# Patient Record
Sex: FEMALE | Race: ASIAN | Employment: OTHER | URBAN - METROPOLITAN AREA
[De-identification: names, ages, dates, MRNs, and addresses within clinical notes are randomized per-mention and may not be internally consistent; named-entity substitution may affect disease eponyms.]

---

## 2017-01-01 ENCOUNTER — APPOINTMENT (OUTPATIENT)
Dept: CARDIOLOGY | Facility: CLINIC | Age: 82
DRG: 682 | End: 2017-01-01
Attending: INTERNAL MEDICINE
Payer: MEDICARE

## 2017-01-01 ENCOUNTER — HOSPITAL ENCOUNTER (INPATIENT)
Facility: CLINIC | Age: 82
LOS: 1 days | Discharge: HOME HEALTH | DRG: 682 | End: 2017-01-25
Attending: INTERNAL MEDICINE | Admitting: SURGERY
Payer: MEDICARE

## 2017-01-01 ENCOUNTER — APPOINTMENT (OUTPATIENT)
Dept: GENERAL RADIOLOGY | Facility: CLINIC | Age: 82
End: 2017-01-01
Attending: EMERGENCY MEDICINE
Payer: MEDICARE

## 2017-01-01 ENCOUNTER — TELEPHONE (OUTPATIENT)
Dept: INTERNAL MEDICINE | Facility: CLINIC | Age: 82
End: 2017-01-01

## 2017-01-01 ENCOUNTER — HOSPITAL ENCOUNTER (EMERGENCY)
Facility: CLINIC | Age: 82
Discharge: ANOTHER HOSPITAL | End: 2017-01-24
Attending: EMERGENCY MEDICINE | Admitting: EMERGENCY MEDICINE
Payer: MEDICARE

## 2017-01-01 ENCOUNTER — APPOINTMENT (OUTPATIENT)
Dept: CT IMAGING | Facility: CLINIC | Age: 82
End: 2017-01-01
Attending: EMERGENCY MEDICINE
Payer: MEDICARE

## 2017-01-01 VITALS
WEIGHT: 99 LBS | HEART RATE: 86 BPM | DIASTOLIC BLOOD PRESSURE: 75 MMHG | HEIGHT: 61 IN | BODY MASS INDEX: 18.69 KG/M2 | TEMPERATURE: 97.5 F | OXYGEN SATURATION: 99 % | RESPIRATION RATE: 20 BRPM | SYSTOLIC BLOOD PRESSURE: 140 MMHG

## 2017-01-01 VITALS
DIASTOLIC BLOOD PRESSURE: 100 MMHG | OXYGEN SATURATION: 96 % | RESPIRATION RATE: 20 BRPM | TEMPERATURE: 97.1 F | WEIGHT: 110.01 LBS | HEIGHT: 61 IN | SYSTOLIC BLOOD PRESSURE: 122 MMHG | BODY MASS INDEX: 20.77 KG/M2

## 2017-01-01 DIAGNOSIS — N17.9 ACUTE RENAL FAILURE, UNSPECIFIED ACUTE RENAL FAILURE TYPE (H): ICD-10-CM

## 2017-01-01 DIAGNOSIS — F51.01 PRIMARY INSOMNIA: ICD-10-CM

## 2017-01-01 DIAGNOSIS — G89.29 OTHER CHRONIC PAIN: Primary | ICD-10-CM

## 2017-01-01 DIAGNOSIS — I21.4 NSTEMI (NON-ST ELEVATED MYOCARDIAL INFARCTION) (H): ICD-10-CM

## 2017-01-01 DIAGNOSIS — N18.6 ESRD (END STAGE RENAL DISEASE) (H): Primary | ICD-10-CM

## 2017-01-01 DIAGNOSIS — E87.5 HYPERKALEMIA: ICD-10-CM

## 2017-01-01 LAB
ALBUMIN SERPL-MCNC: 2.4 G/DL (ref 3.4–5)
ALBUMIN UR-MCNC: 300 MG/DL
ALP SERPL-CCNC: 120 U/L (ref 40–150)
ALT SERPL W P-5'-P-CCNC: 33 U/L (ref 0–50)
ANION GAP SERPL CALCULATED.3IONS-SCNC: 10 MMOL/L (ref 3–14)
ANION GAP SERPL CALCULATED.3IONS-SCNC: 11 MMOL/L (ref 3–14)
ANION GAP SERPL CALCULATED.3IONS-SCNC: 11 MMOL/L (ref 3–14)
ANION GAP SERPL CALCULATED.3IONS-SCNC: 12 MMOL/L (ref 6–17)
APPEARANCE UR: CLEAR
AST SERPL W P-5'-P-CCNC: 55 U/L (ref 0–45)
BASE DEFICIT BLDA-SCNC: 11.2 MMOL/L
BASE DEFICIT BLDA-SCNC: 2.8 MMOL/L
BASE DEFICIT BLDV-SCNC: 2.6 MMOL/L
BASE DEFICIT BLDV-SCNC: 9.7 MMOL/L
BASOPHILS # BLD AUTO: 0 10E9/L (ref 0–0.2)
BASOPHILS NFR BLD AUTO: 0 %
BILIRUB DIRECT SERPL-MCNC: <0.1 MG/DL (ref 0–0.2)
BILIRUB SERPL-MCNC: 0.3 MG/DL (ref 0.2–1.3)
BILIRUB UR QL STRIP: NEGATIVE
BUN SERPL-MCNC: 100 MG/DL (ref 7–30)
BUN SERPL-MCNC: 102 MG/DL (ref 7–30)
BUN SERPL-MCNC: 103 MG/DL (ref 7–30)
BUN SERPL-MCNC: 111 MG/DL (ref 7–30)
CA-I BLD-MCNC: 3.9 MG/DL (ref 4.4–5.2)
CA-I BLD-SCNC: 3.9 MG/DL (ref 4.4–5.2)
CALCIUM SERPL-MCNC: 7.1 MG/DL (ref 8.5–10.1)
CALCIUM SERPL-MCNC: 7.3 MG/DL (ref 8.5–10.1)
CALCIUM SERPL-MCNC: 7.3 MG/DL (ref 8.5–10.1)
CHLORIDE BLD-SCNC: 104 MMOL/L (ref 94–109)
CHLORIDE SERPL-SCNC: 105 MMOL/L (ref 94–109)
CHLORIDE SERPL-SCNC: 106 MMOL/L (ref 94–109)
CHLORIDE SERPL-SCNC: 106 MMOL/L (ref 94–109)
CO2 BLD-SCNC: 21 MMOL/L (ref 20–32)
CO2 SERPL-SCNC: 17 MMOL/L (ref 20–32)
CO2 SERPL-SCNC: 22 MMOL/L (ref 20–32)
CO2 SERPL-SCNC: 22 MMOL/L (ref 20–32)
COLOR UR AUTO: YELLOW
CREAT BLD-MCNC: 10.5 MG/DL (ref 0.52–1.04)
CREAT SERPL-MCNC: 10.6 MG/DL (ref 0.52–1.04)
CREAT SERPL-MCNC: 10.6 MG/DL (ref 0.52–1.04)
CREAT SERPL-MCNC: 10.8 MG/DL (ref 0.52–1.04)
D DIMER PPP FEU-MCNC: 2.4 UG/ML FEU (ref 0–0.5)
DIFFERENTIAL METHOD BLD: ABNORMAL
EOSINOPHIL # BLD AUTO: 0 10E9/L (ref 0–0.7)
EOSINOPHIL NFR BLD AUTO: 0 %
ERYTHROCYTE [DISTWIDTH] IN BLOOD BY AUTOMATED COUNT: 13.7 % (ref 10–15)
ERYTHROCYTE [DISTWIDTH] IN BLOOD BY AUTOMATED COUNT: 14 % (ref 10–15)
ERYTHROCYTE [DISTWIDTH] IN BLOOD BY AUTOMATED COUNT: 14.5 % (ref 10–15)
ERYTHROCYTE [DISTWIDTH] IN BLOOD BY AUTOMATED COUNT: 14.5 % (ref 10–15)
GFR SERPL CREATININE-BSD FRML MDRD: 3 ML/MIN/1.7M2
GLUCOSE BLD-MCNC: 126 MG/DL (ref 70–99)
GLUCOSE BLDC GLUCOMTR-MCNC: 113 MG/DL (ref 70–99)
GLUCOSE BLDC GLUCOMTR-MCNC: 121 MG/DL (ref 70–99)
GLUCOSE BLDC GLUCOMTR-MCNC: 128 MG/DL (ref 70–99)
GLUCOSE BLDC GLUCOMTR-MCNC: 151 MG/DL (ref 70–99)
GLUCOSE BLDC GLUCOMTR-MCNC: 230 MG/DL (ref 70–99)
GLUCOSE BLDC GLUCOMTR-MCNC: 83 MG/DL (ref 70–99)
GLUCOSE BLDC GLUCOMTR-MCNC: 91 MG/DL (ref 70–99)
GLUCOSE SERPL-MCNC: 124 MG/DL (ref 70–99)
GLUCOSE SERPL-MCNC: 151 MG/DL (ref 70–99)
GLUCOSE SERPL-MCNC: 84 MG/DL (ref 70–99)
GLUCOSE UR STRIP-MCNC: 300 MG/DL
HCO3 BLD-SCNC: 15 MMOL/L (ref 21–28)
HCO3 BLD-SCNC: 23 MMOL/L (ref 21–28)
HCO3 BLDV-SCNC: 17 MMOL/L (ref 21–28)
HCO3 BLDV-SCNC: 23 MMOL/L (ref 21–28)
HCT VFR BLD AUTO: 25.9 % (ref 35–47)
HCT VFR BLD AUTO: 26.9 % (ref 35–47)
HCT VFR BLD AUTO: 27.8 % (ref 35–47)
HCT VFR BLD AUTO: 29.3 % (ref 35–47)
HCT VFR BLD CALC: 24 %PCV (ref 35–47)
HGB BLD CALC-MCNC: 8.2 G/DL (ref 11.7–15.7)
HGB BLD-MCNC: 8.5 G/DL (ref 11.7–15.7)
HGB BLD-MCNC: 8.8 G/DL (ref 11.7–15.7)
HGB BLD-MCNC: 9.1 G/DL (ref 11.7–15.7)
HGB BLD-MCNC: 9.3 G/DL (ref 11.7–15.7)
HGB UR QL STRIP: ABNORMAL
HYALINE CASTS #/AREA URNS LPF: 1 /LPF (ref 0–2)
IMM GRANULOCYTES # BLD: 0 10E9/L (ref 0–0.4)
IMM GRANULOCYTES NFR BLD: 0.6 %
INR PPP: 1.34 (ref 0.86–1.14)
INTERPRETATION ECG - MUSE: NORMAL
KETONES UR STRIP-MCNC: NEGATIVE MG/DL
LEUKOCYTE ESTERASE UR QL STRIP: NEGATIVE
LIPASE SERPL-CCNC: 501 U/L (ref 73–393)
LMWH PPP CHRO-ACNC: 0.86 IU/ML
LYMPHOCYTES # BLD AUTO: 0.9 10E9/L (ref 0.8–5.3)
LYMPHOCYTES NFR BLD AUTO: 19.2 %
MAGNESIUM SERPL-MCNC: 3.5 MG/DL (ref 1.6–2.3)
MAGNESIUM SERPL-MCNC: 3.7 MG/DL (ref 1.6–2.3)
MAGNESIUM SERPL-MCNC: 3.8 MG/DL (ref 1.6–2.3)
MCH RBC QN AUTO: 29.7 PG (ref 26.5–33)
MCH RBC QN AUTO: 30 PG (ref 26.5–33)
MCH RBC QN AUTO: 30.2 PG (ref 26.5–33)
MCH RBC QN AUTO: 30.4 PG (ref 26.5–33)
MCHC RBC AUTO-ENTMCNC: 31.7 G/DL (ref 31.5–36.5)
MCHC RBC AUTO-ENTMCNC: 32.7 G/DL (ref 31.5–36.5)
MCHC RBC AUTO-ENTMCNC: 32.7 G/DL (ref 31.5–36.5)
MCHC RBC AUTO-ENTMCNC: 32.8 G/DL (ref 31.5–36.5)
MCV RBC AUTO: 91 FL (ref 78–100)
MCV RBC AUTO: 92 FL (ref 78–100)
MCV RBC AUTO: 93 FL (ref 78–100)
MCV RBC AUTO: 95 FL (ref 78–100)
MONOCYTES # BLD AUTO: 0.3 10E9/L (ref 0–1.3)
MONOCYTES NFR BLD AUTO: 7.2 %
MUCOUS THREADS #/AREA URNS LPF: PRESENT /LPF
NEUTROPHILS # BLD AUTO: 3.5 10E9/L (ref 1.6–8.3)
NEUTROPHILS NFR BLD AUTO: 72 %
NITRATE UR QL: NEGATIVE
NRBC # BLD AUTO: 0 10*3/UL
NRBC BLD AUTO-RTO: 1 /100
O2/TOTAL GAS SETTING VFR VENT: ABNORMAL %
OXYHGB MFR BLDV: 65 %
OXYHGB MFR BLDV: 98 %
PCO2 BLD: 35 MM HG (ref 35–45)
PCO2 BLD: 41 MM HG (ref 35–45)
PCO2 BLDV: 39 MM HG (ref 40–50)
PCO2 BLDV: 41 MM HG (ref 40–50)
PH BLD: 7.24 PH (ref 7.35–7.45)
PH BLD: 7.35 PH (ref 7.35–7.45)
PH BLDV: 7.24 PH (ref 7.32–7.43)
PH BLDV: 7.35 PH (ref 7.32–7.43)
PH UR STRIP: 7 PH (ref 5–7)
PHOSPHATE SERPL-MCNC: 7.5 MG/DL (ref 2.5–4.5)
PHOSPHATE SERPL-MCNC: 8.2 MG/DL (ref 2.5–4.5)
PHOSPHATE SERPL-MCNC: 8.5 MG/DL (ref 2.5–4.5)
PLATELET # BLD AUTO: 192 10E9/L (ref 150–450)
PLATELET # BLD AUTO: 207 10E9/L (ref 150–450)
PLATELET # BLD AUTO: 215 10E9/L (ref 150–450)
PLATELET # BLD AUTO: 235 10E9/L (ref 150–450)
PO2 BLD: 45 MM HG (ref 80–105)
PO2 BLD: 89 MM HG (ref 80–105)
PO2 BLDV: 114 MM HG (ref 25–47)
PO2 BLDV: 41 MM HG (ref 25–47)
POTASSIUM BLD-SCNC: 6.4 MMOL/L (ref 3.4–5.3)
POTASSIUM SERPL-SCNC: 5.5 MMOL/L (ref 3.4–5.3)
POTASSIUM SERPL-SCNC: 6 MMOL/L (ref 3.4–5.3)
POTASSIUM SERPL-SCNC: 6.1 MMOL/L (ref 3.4–5.3)
POTASSIUM SERPL-SCNC: 6.2 MMOL/L (ref 3.4–5.3)
POTASSIUM SERPL-SCNC: 7 MMOL/L (ref 3.4–5.3)
PROT SERPL-MCNC: 6.4 G/DL (ref 6.8–8.8)
RBC # BLD AUTO: 2.8 10E12/L (ref 3.8–5.2)
RBC # BLD AUTO: 2.96 10E12/L (ref 3.8–5.2)
RBC # BLD AUTO: 3.03 10E12/L (ref 3.8–5.2)
RBC # BLD AUTO: 3.08 10E12/L (ref 3.8–5.2)
RBC #/AREA URNS AUTO: 88 /HPF (ref 0–2)
SODIUM BLD-SCNC: 137 MMOL/L (ref 133–144)
SODIUM SERPL-SCNC: 134 MMOL/L (ref 133–144)
SODIUM SERPL-SCNC: 138 MMOL/L (ref 133–144)
SODIUM SERPL-SCNC: 138 MMOL/L (ref 133–144)
SP GR UR STRIP: 1.01 (ref 1–1.03)
TROPONIN I SERPL-MCNC: 0.75 UG/L (ref 0–0.04)
TROPONIN I SERPL-MCNC: 1.53 UG/L (ref 0–0.04)
TROPONIN I SERPL-MCNC: 1.81 UG/L (ref 0–0.04)
URN SPEC COLLECT METH UR: ABNORMAL
UROBILINOGEN UR STRIP-MCNC: NORMAL MG/DL (ref 0–2)
WBC # BLD AUTO: 4.6 10E9/L (ref 4–11)
WBC # BLD AUTO: 4.7 10E9/L (ref 4–11)
WBC # BLD AUTO: 5.1 10E9/L (ref 4–11)
WBC # BLD AUTO: 7.3 10E9/L (ref 4–11)
WBC #/AREA URNS AUTO: 3 /HPF (ref 0–2)

## 2017-01-01 PROCEDURE — 40000115 ZZH STATISTIC NURSE TLC VISIT: Performed by: CLINICAL NURSE SPECIALIST

## 2017-01-01 PROCEDURE — 93005 ELECTROCARDIOGRAM TRACING: CPT | Performed by: OPTOMETRIST

## 2017-01-01 PROCEDURE — 25000132 ZZH RX MED GY IP 250 OP 250 PS 637: Performed by: EMERGENCY MEDICINE

## 2017-01-01 PROCEDURE — 25800025 ZZH RX 258

## 2017-01-01 PROCEDURE — 25000125 ZZHC RX 250: Performed by: EMERGENCY MEDICINE

## 2017-01-01 PROCEDURE — 96365 THER/PROPH/DIAG IV INF INIT: CPT

## 2017-01-01 PROCEDURE — 40000264 ECHO COMPLETE WITH OPTISON

## 2017-01-01 PROCEDURE — 80076 HEPATIC FUNCTION PANEL: CPT | Performed by: EMERGENCY MEDICINE

## 2017-01-01 PROCEDURE — 83735 ASSAY OF MAGNESIUM: CPT | Performed by: PEDIATRICS

## 2017-01-01 PROCEDURE — 85379 FIBRIN DEGRADATION QUANT: CPT | Performed by: EMERGENCY MEDICINE

## 2017-01-01 PROCEDURE — 25000308 HC RX OP HPI UCR WEL MED 250 IP 250: Performed by: EMERGENCY MEDICINE

## 2017-01-01 PROCEDURE — 85027 COMPLETE CBC AUTOMATED: CPT | Performed by: EMERGENCY MEDICINE

## 2017-01-01 PROCEDURE — 40000894 ZZH STATISTIC OT IP EVAL DEFER: Performed by: OCCUPATIONAL THERAPIST

## 2017-01-01 PROCEDURE — 84132 ASSAY OF SERUM POTASSIUM: CPT | Performed by: PEDIATRICS

## 2017-01-01 PROCEDURE — 25000132 ZZH RX MED GY IP 250 OP 250 PS 637: Mod: GY | Performed by: CLINICAL NURSE SPECIALIST

## 2017-01-01 PROCEDURE — 84484 ASSAY OF TROPONIN QUANT: CPT | Performed by: EMERGENCY MEDICINE

## 2017-01-01 PROCEDURE — 25000125 ZZHC RX 250: Performed by: PEDIATRICS

## 2017-01-01 PROCEDURE — 25000128 H RX IP 250 OP 636: Performed by: EMERGENCY MEDICINE

## 2017-01-01 PROCEDURE — 36415 COLL VENOUS BLD VENIPUNCTURE: CPT | Performed by: PEDIATRICS

## 2017-01-01 PROCEDURE — 00000146 ZZHCL STATISTIC GLUCOSE BY METER IP

## 2017-01-01 PROCEDURE — 25000128 H RX IP 250 OP 636: Performed by: INTERNAL MEDICINE

## 2017-01-01 PROCEDURE — 99223 1ST HOSP IP/OBS HIGH 75: CPT | Mod: GV | Performed by: CLINICAL NURSE SPECIALIST

## 2017-01-01 PROCEDURE — 85027 COMPLETE CBC AUTOMATED: CPT | Performed by: PEDIATRICS

## 2017-01-01 PROCEDURE — 99207 ZZC CDG-CORRECTLY CODED, REVIEWED AND AGREE: CPT | Performed by: CLINICAL NURSE SPECIALIST

## 2017-01-01 PROCEDURE — 82805 BLOOD GASES W/O2 SATURATION: CPT | Performed by: EMERGENCY MEDICINE

## 2017-01-01 PROCEDURE — 20000004 ZZH R&B ICU UMMC

## 2017-01-01 PROCEDURE — 74176 CT ABD & PELVIS W/O CONTRAST: CPT

## 2017-01-01 PROCEDURE — 36600 WITHDRAWAL OF ARTERIAL BLOOD: CPT

## 2017-01-01 PROCEDURE — 83690 ASSAY OF LIPASE: CPT | Performed by: EMERGENCY MEDICINE

## 2017-01-01 PROCEDURE — 80048 BASIC METABOLIC PNL TOTAL CA: CPT | Performed by: PEDIATRICS

## 2017-01-01 PROCEDURE — 82803 BLOOD GASES ANY COMBINATION: CPT | Performed by: PEDIATRICS

## 2017-01-01 PROCEDURE — 25800025 ZZH RX 258: Performed by: EMERGENCY MEDICINE

## 2017-01-01 PROCEDURE — 80047 BASIC METABLC PNL IONIZED CA: CPT

## 2017-01-01 PROCEDURE — 25000132 ZZH RX MED GY IP 250 OP 250 PS 637: Mod: GY | Performed by: PEDIATRICS

## 2017-01-01 PROCEDURE — 25500064 ZZH RX 255 OP 636: Performed by: INTERNAL MEDICINE

## 2017-01-01 PROCEDURE — 40000275 ZZH STATISTIC RCP TIME EA 10 MIN: Performed by: OPTOMETRIST

## 2017-01-01 PROCEDURE — 71010 XR CHEST PORT 1 VW: CPT

## 2017-01-01 PROCEDURE — 94640 AIRWAY INHALATION TREATMENT: CPT

## 2017-01-01 PROCEDURE — 93005 ELECTROCARDIOGRAM TRACING: CPT

## 2017-01-01 PROCEDURE — 82803 BLOOD GASES ANY COMBINATION: CPT | Performed by: EMERGENCY MEDICINE

## 2017-01-01 PROCEDURE — 99285 EMERGENCY DEPT VISIT HI MDM: CPT | Mod: 25

## 2017-01-01 PROCEDURE — 85025 COMPLETE CBC W/AUTO DIFF WBC: CPT | Performed by: EMERGENCY MEDICINE

## 2017-01-01 PROCEDURE — 96367 TX/PROPH/DG ADDL SEQ IV INF: CPT

## 2017-01-01 PROCEDURE — 84100 ASSAY OF PHOSPHORUS: CPT | Performed by: EMERGENCY MEDICINE

## 2017-01-01 PROCEDURE — 93306 TTE W/DOPPLER COMPLETE: CPT | Mod: 26 | Performed by: INTERNAL MEDICINE

## 2017-01-01 PROCEDURE — 99238 HOSP IP/OBS DSCHRG MGMT 30/<: CPT | Mod: GC | Performed by: INTERNAL MEDICINE

## 2017-01-01 PROCEDURE — 93010 ELECTROCARDIOGRAM REPORT: CPT | Mod: GW | Performed by: INTERNAL MEDICINE

## 2017-01-01 PROCEDURE — S0171 BUMETANIDE 0.5 MG: HCPCS | Performed by: PEDIATRICS

## 2017-01-01 PROCEDURE — A9270 NON-COVERED ITEM OR SERVICE: HCPCS | Mod: GY | Performed by: CLINICAL NURSE SPECIALIST

## 2017-01-01 PROCEDURE — 36415 COLL VENOUS BLD VENIPUNCTURE: CPT | Performed by: EMERGENCY MEDICINE

## 2017-01-01 PROCEDURE — 25000125 ZZHC RX 250: Performed by: INTERNAL MEDICINE

## 2017-01-01 PROCEDURE — 82565 ASSAY OF CREATININE: CPT

## 2017-01-01 PROCEDURE — 96366 THER/PROPH/DIAG IV INF ADDON: CPT

## 2017-01-01 PROCEDURE — 96368 THER/DIAG CONCURRENT INF: CPT

## 2017-01-01 PROCEDURE — 84100 ASSAY OF PHOSPHORUS: CPT | Performed by: PEDIATRICS

## 2017-01-01 PROCEDURE — 83735 ASSAY OF MAGNESIUM: CPT | Performed by: EMERGENCY MEDICINE

## 2017-01-01 PROCEDURE — 81001 URINALYSIS AUTO W/SCOPE: CPT | Performed by: EMERGENCY MEDICINE

## 2017-01-01 PROCEDURE — 85520 HEPARIN ASSAY: CPT | Performed by: PEDIATRICS

## 2017-01-01 PROCEDURE — 85014 HEMATOCRIT: CPT

## 2017-01-01 PROCEDURE — 82330 ASSAY OF CALCIUM: CPT | Performed by: PEDIATRICS

## 2017-01-01 PROCEDURE — 85610 PROTHROMBIN TIME: CPT | Performed by: PEDIATRICS

## 2017-01-01 PROCEDURE — 84484 ASSAY OF TROPONIN QUANT: CPT | Performed by: PEDIATRICS

## 2017-01-01 PROCEDURE — 40000275 ZZH STATISTIC RCP TIME EA 10 MIN

## 2017-01-01 PROCEDURE — 80048 BASIC METABOLIC PNL TOTAL CA: CPT | Performed by: EMERGENCY MEDICINE

## 2017-01-01 PROCEDURE — 96375 TX/PRO/DX INJ NEW DRUG ADDON: CPT

## 2017-01-01 RX ORDER — DEXTROSE MONOHYDRATE 25 G/50ML
25-50 INJECTION, SOLUTION INTRAVENOUS
Status: DISCONTINUED | OUTPATIENT
Start: 2017-01-01 | End: 2017-01-01 | Stop reason: HOSPADM

## 2017-01-01 RX ORDER — ALBUTEROL SULFATE 5 MG/ML
10 SOLUTION RESPIRATORY (INHALATION) ONCE
Status: COMPLETED | OUTPATIENT
Start: 2017-01-01 | End: 2017-01-01

## 2017-01-01 RX ORDER — TRAMADOL HYDROCHLORIDE 50 MG/1
50 TABLET ORAL EVERY 6 HOURS PRN
Qty: 10 TABLET | Refills: 0 | Status: SHIPPED | OUTPATIENT
Start: 2017-01-01

## 2017-01-01 RX ORDER — TRAMADOL HYDROCHLORIDE 50 MG/1
50-100 TABLET ORAL EVERY 6 HOURS PRN
Qty: 30 TABLET | Refills: 0 | Status: SHIPPED | OUTPATIENT
Start: 2017-01-01

## 2017-01-01 RX ORDER — DEXTROSE MONOHYDRATE 25 G/50ML
INJECTION, SOLUTION INTRAVENOUS
Status: COMPLETED
Start: 2017-01-01 | End: 2017-01-01

## 2017-01-01 RX ORDER — ONDANSETRON 4 MG/1
4 TABLET, ORALLY DISINTEGRATING ORAL EVERY 6 HOURS PRN
Status: DISCONTINUED | OUTPATIENT
Start: 2017-01-01 | End: 2017-01-01 | Stop reason: HOSPADM

## 2017-01-01 RX ORDER — ACETAMINOPHEN 325 MG/1
650 TABLET ORAL EVERY 4 HOURS PRN
Status: DISCONTINUED | OUTPATIENT
Start: 2017-01-01 | End: 2017-01-01 | Stop reason: HOSPADM

## 2017-01-01 RX ORDER — DEXTROSE MONOHYDRATE 25 G/50ML
25 INJECTION, SOLUTION INTRAVENOUS ONCE
Status: COMPLETED | OUTPATIENT
Start: 2017-01-01 | End: 2017-01-01

## 2017-01-01 RX ORDER — HYDROMORPHONE HYDROCHLORIDE 1 MG/ML
1-2 SOLUTION ORAL
Qty: 1 BOTTLE | Refills: 0 | Status: SHIPPED | OUTPATIENT
Start: 2017-01-01

## 2017-01-01 RX ORDER — NICOTINE POLACRILEX 4 MG
15-30 LOZENGE BUCCAL
Status: DISCONTINUED | OUTPATIENT
Start: 2017-01-01 | End: 2017-01-01 | Stop reason: HOSPADM

## 2017-01-01 RX ORDER — NALOXONE HYDROCHLORIDE 0.4 MG/ML
.1-.4 INJECTION, SOLUTION INTRAMUSCULAR; INTRAVENOUS; SUBCUTANEOUS
Status: DISCONTINUED | OUTPATIENT
Start: 2017-01-01 | End: 2017-01-01 | Stop reason: HOSPADM

## 2017-01-01 RX ORDER — TRAMADOL HYDROCHLORIDE 50 MG/1
50-100 TABLET ORAL EVERY 6 HOURS PRN
Status: DISCONTINUED | OUTPATIENT
Start: 2017-01-01 | End: 2017-01-01

## 2017-01-01 RX ORDER — HYDROMORPHONE HCL/0.9% NACL/PF 0.2MG/0.2
0.2 SYRINGE (ML) INTRAVENOUS
Status: COMPLETED | OUTPATIENT
Start: 2017-01-01 | End: 2017-01-01

## 2017-01-01 RX ORDER — BISACODYL 10 MG
10 SUPPOSITORY, RECTAL RECTAL DAILY PRN
Qty: 30 SUPPOSITORY | Refills: 1 | Status: SHIPPED | OUTPATIENT
Start: 2017-01-01

## 2017-01-01 RX ORDER — LEVOTHYROXINE SODIUM 25 UG/1
25 TABLET ORAL DAILY
Status: DISCONTINUED | OUTPATIENT
Start: 2017-01-01 | End: 2017-01-01

## 2017-01-01 RX ORDER — HALOPERIDOL 2 MG/ML
2 SOLUTION ORAL EVERY 6 HOURS PRN
Qty: 60 ML | Refills: 1 | Status: SHIPPED | OUTPATIENT
Start: 2017-01-01

## 2017-01-01 RX ORDER — TRAZODONE HYDROCHLORIDE 50 MG/1
50 TABLET, FILM COATED ORAL AT BEDTIME
Status: DISCONTINUED | OUTPATIENT
Start: 2017-01-01 | End: 2017-01-01

## 2017-01-01 RX ORDER — ACETAMINOPHEN 325 MG/1
650 TABLET ORAL EVERY 4 HOURS PRN
Qty: 100 TABLET | Refills: 1 | Status: SHIPPED | OUTPATIENT
Start: 2017-01-01

## 2017-01-01 RX ORDER — ATROPINE SULFATE 10 MG/ML
1-2 SOLUTION/ DROPS OPHTHALMIC 2 TIMES DAILY PRN
Status: DISCONTINUED | OUTPATIENT
Start: 2017-01-01 | End: 2017-01-01 | Stop reason: HOSPADM

## 2017-01-01 RX ORDER — FUROSEMIDE 10 MG/ML
20 INJECTION INTRAMUSCULAR; INTRAVENOUS ONCE
Status: COMPLETED | OUTPATIENT
Start: 2017-01-01 | End: 2017-01-01

## 2017-01-01 RX ORDER — DEXTROSE MONOHYDRATE 100 MG/ML
INJECTION, SOLUTION INTRAVENOUS CONTINUOUS
Status: DISPENSED | OUTPATIENT
Start: 2017-01-01 | End: 2017-01-01

## 2017-01-01 RX ORDER — ACETAMINOPHEN 650 MG/1
650 SUPPOSITORY RECTAL EVERY 4 HOURS PRN
Qty: 60 SUPPOSITORY | Refills: 1 | Status: SHIPPED | OUTPATIENT
Start: 2017-01-01

## 2017-01-01 RX ORDER — CLOPIDOGREL BISULFATE 75 MG/1
75 TABLET ORAL DAILY
Status: DISCONTINUED | OUTPATIENT
Start: 2017-01-01 | End: 2017-01-01

## 2017-01-01 RX ORDER — ONDANSETRON 4 MG/1
4 TABLET, ORALLY DISINTEGRATING ORAL EVERY 6 HOURS PRN
Qty: 120 TABLET | Refills: 1 | Status: SHIPPED | OUTPATIENT
Start: 2017-01-01

## 2017-01-01 RX ORDER — ACETAMINOPHEN 650 MG/1
650 SUPPOSITORY RECTAL EVERY 4 HOURS PRN
Status: DISCONTINUED | OUTPATIENT
Start: 2017-01-01 | End: 2017-01-01 | Stop reason: HOSPADM

## 2017-01-01 RX ORDER — SENNOSIDES 8.6 MG
1 TABLET ORAL 2 TIMES DAILY PRN
Qty: 120 EACH | Refills: 0 | Status: SHIPPED | OUTPATIENT
Start: 2017-01-01

## 2017-01-01 RX ORDER — HYDROMORPHONE HYDROCHLORIDE 1 MG/ML
1-2 SOLUTION ORAL
Status: DISCONTINUED | OUTPATIENT
Start: 2017-01-01 | End: 2017-01-01 | Stop reason: HOSPADM

## 2017-01-01 RX ORDER — ONDANSETRON 2 MG/ML
4 INJECTION INTRAMUSCULAR; INTRAVENOUS EVERY 30 MIN PRN
Status: DISCONTINUED | OUTPATIENT
Start: 2017-01-01 | End: 2017-01-01 | Stop reason: HOSPADM

## 2017-01-01 RX ORDER — BUMETANIDE 0.25 MG/ML
2 INJECTION INTRAMUSCULAR; INTRAVENOUS ONCE
Status: COMPLETED | OUTPATIENT
Start: 2017-01-01 | End: 2017-01-01

## 2017-01-01 RX ORDER — ATROPINE SULFATE 10 MG/ML
1-2 SOLUTION/ DROPS OPHTHALMIC 2 TIMES DAILY PRN
Qty: 1 BOTTLE | Refills: 1 | Status: SHIPPED | OUTPATIENT
Start: 2017-01-01

## 2017-01-01 RX ORDER — LIDOCAINE 40 MG/G
CREAM TOPICAL
Status: DISCONTINUED | OUTPATIENT
Start: 2017-01-01 | End: 2017-01-01 | Stop reason: HOSPADM

## 2017-01-01 RX ORDER — BISACODYL 10 MG
10 SUPPOSITORY, RECTAL RECTAL DAILY PRN
Status: DISCONTINUED | OUTPATIENT
Start: 2017-01-01 | End: 2017-01-01 | Stop reason: HOSPADM

## 2017-01-01 RX ORDER — HALOPERIDOL 2 MG/ML
2 SOLUTION ORAL EVERY 6 HOURS PRN
Status: DISCONTINUED | OUTPATIENT
Start: 2017-01-01 | End: 2017-01-01 | Stop reason: HOSPADM

## 2017-01-01 RX ORDER — HALOPERIDOL 1 MG/1
1-2 TABLET ORAL EVERY 6 HOURS PRN
Qty: 15 TABLET | Refills: 1 | Status: SHIPPED | OUTPATIENT
Start: 2017-01-01

## 2017-01-01 RX ORDER — TRAZODONE HYDROCHLORIDE 50 MG/1
50 TABLET, FILM COATED ORAL
Qty: 15 TABLET | Refills: 4 | Status: SHIPPED
Start: 2017-01-01

## 2017-01-01 RX ORDER — TRAZODONE HYDROCHLORIDE 50 MG/1
50-100 TABLET, FILM COATED ORAL AT BEDTIME
Status: DISCONTINUED | OUTPATIENT
Start: 2017-01-01 | End: 2017-01-01

## 2017-01-01 RX ORDER — HALOPERIDOL 1 MG/1
1-2 TABLET ORAL EVERY 6 HOURS PRN
Status: DISCONTINUED | OUTPATIENT
Start: 2017-01-01 | End: 2017-01-01 | Stop reason: HOSPADM

## 2017-01-01 RX ORDER — SENNOSIDES 8.6 MG
8.6 TABLET ORAL 2 TIMES DAILY PRN
Status: DISCONTINUED | OUTPATIENT
Start: 2017-01-01 | End: 2017-01-01 | Stop reason: HOSPADM

## 2017-01-01 RX ORDER — ONDANSETRON 2 MG/ML
4 INJECTION INTRAMUSCULAR; INTRAVENOUS EVERY 6 HOURS PRN
Status: DISCONTINUED | OUTPATIENT
Start: 2017-01-01 | End: 2017-01-01 | Stop reason: HOSPADM

## 2017-01-01 RX ORDER — HEPARIN SODIUM 10000 [USP'U]/100ML
0-3500 INJECTION, SOLUTION INTRAVENOUS CONTINUOUS
Status: DISCONTINUED | OUTPATIENT
Start: 2017-01-01 | End: 2017-01-01

## 2017-01-01 RX ADMIN — Medication 2700 UNITS: at 18:07

## 2017-01-01 RX ADMIN — HUMAN ALBUMIN MICROSPHERES AND PERFLUTREN 6 ML: 10; .22 INJECTION, SOLUTION INTRAVENOUS at 09:45

## 2017-01-01 RX ADMIN — DEXTROSE MONOHYDRATE 25 G: 500 INJECTION PARENTERAL at 01:38

## 2017-01-01 RX ADMIN — BUMETANIDE 2 MG: 0.25 INJECTION, SOLUTION INTRAMUSCULAR; INTRAVENOUS at 03:08

## 2017-01-01 RX ADMIN — HYDROMORPHONE HYDROCHLORIDE 0.2 MG: 10 INJECTION, SOLUTION INTRAMUSCULAR; INTRAVENOUS; SUBCUTANEOUS at 14:41

## 2017-01-01 RX ADMIN — ALBUTEROL SULFATE 10 MG: 2.5 SOLUTION RESPIRATORY (INHALATION) at 16:06

## 2017-01-01 RX ADMIN — FUROSEMIDE 20 MG: 10 INJECTION, SOLUTION INTRAMUSCULAR; INTRAVENOUS at 16:19

## 2017-01-01 RX ADMIN — Medication 4.5 UNITS: at 01:38

## 2017-01-01 RX ADMIN — SODIUM CHLORIDE 4.5 UNITS: 9 INJECTION, SOLUTION INTRAVENOUS at 16:17

## 2017-01-01 RX ADMIN — Medication 2 MG: at 17:53

## 2017-01-01 RX ADMIN — ONDANSETRON 4 MG: 2 INJECTION INTRAMUSCULAR; INTRAVENOUS at 14:41

## 2017-01-01 RX ADMIN — SODIUM CHLORIDE 1000 ML: 9 INJECTION, SOLUTION INTRAVENOUS at 08:45

## 2017-01-01 RX ADMIN — DEXTROSE MONOHYDRATE 25 G: 25 INJECTION, SOLUTION INTRAVENOUS at 01:38

## 2017-01-01 RX ADMIN — HEPARIN SODIUM 550 UNITS/HR: 10000 INJECTION, SOLUTION INTRAVENOUS at 18:09

## 2017-01-01 RX ADMIN — DEXTROSE MONOHYDRATE 25 G: 500 INJECTION PARENTERAL at 16:17

## 2017-01-01 RX ADMIN — DEXTROSE AND SODIUM CHLORIDE: 5; 900 INJECTION, SOLUTION INTRAVENOUS at 19:32

## 2017-01-01 RX ADMIN — HEPARIN SODIUM 600 UNITS/HR: 10000 INJECTION, SOLUTION INTRAVENOUS at 03:08

## 2017-01-01 RX ADMIN — CALCIUM GLUCONATE 1 G: 94 INJECTION, SOLUTION INTRAVENOUS at 16:28

## 2017-01-01 RX ADMIN — SODIUM BICARBONATE 150 MEQ: 84 INJECTION, SOLUTION INTRAVENOUS at 16:24

## 2017-01-01 ASSESSMENT — ACTIVITIES OF DAILY LIVING (ADL)
RETIRED_EATING: 0-->INDEPENDENT
TRANSFERRING: 1-->ASSISTIVE EQUIPMENT
AMBULATION: 1-->ASSISTIVE EQUIPMENT
BATHING: 1-->ASSISTIVE EQUIPMENT
DRESS: 0-->INDEPENDENT
TOILETING: 1-->ASSISTIVE EQUIPMENT
RETIRED_COMMUNICATION: 0-->UNDERSTANDS/COMMUNICATES WITHOUT DIFFICULTY
SWALLOWING: 0-->SWALLOWS FOODS/LIQUIDS WITHOUT DIFFICULTY
FALL_HISTORY_WITHIN_LAST_SIX_MONTHS: NO
COGNITION: 0 - NO COGNITION ISSUES REPORTED

## 2017-01-01 ASSESSMENT — ENCOUNTER SYMPTOMS
FATIGUE: 1
WEAKNESS: 1
VOMITING: 1
NAUSEA: 1
SLEEP DISTURBANCE: 1
SHORTNESS OF BREATH: 1

## 2017-01-05 NOTE — TELEPHONE ENCOUNTER
Tramadol      Last Written Prescription Date:  12/07/16  Last Fill Quantity: 30,   # refills: 0  Last Office Visit with [facility] prescribing provider: 06/21/16  Future Office visit:       Routing refill request to provider for review/approval because:  Drug not on the [facility] refill protocol or controlled substance

## 2017-01-17 NOTE — TELEPHONE ENCOUNTER
PA request from [employer] for Tramadol    This drug is on his formulary but is subject to a quantity limit  The quantity limit allowed is 240 tabs every 30 days    [facility]:  [phone]    Copy of PA in blue folder    Please advise

## 2017-01-24 NOTE — H&P
[facility]  Hospitalist ConsultNote  Name: Brent Bey    MRN: [id number]  YOB: 1926    Age: 89 year old  Date of admission: 1/24/2017  Primary care provider: Corine Healy    Chief Complaint:  Renal failure, chest pain    Assessment and Plan:   Ms. Brent Bey is a pleasant Telugu 89 y.o. Woman w/ hx of CKD w/ baseline Cr around 1.8-1.9 as of one year ago, HTN, prior TIA admitted with chest pain worse with breathing, malaise, poor oral intake and recent nausea/vomiting and weakness found to have a Cr of 10.60 with K of 7.0 and metabolic acidosis as well as new RBBB on EKG and troponin of 0.750.  I did see the patient in the ER as admission to [facility] was anticipated and in particular discussed her findings so far with her daughter via  phone.  I am concerned about sub-massive to massive PE vs ACS as a cause of her chest pain and also obviously her renal failure/hyperkalemia is a pressing issue as well.   At this time she does want further evaluation for potentially reversible conditions and I discussed this likely would mean echocardiogram, V/Q scan, likely heparin, careful management of her renal failure medically but also potentially with hemodialysis and nephrology consultation.   At this point I feel ICU admission is certainly warranted for close monitoring and to expedite these numerous measures but unfortunately we do not have ICU beds available at [facility] due to full census.  She is agreeable to transfer to [facility] or alternatively if we do find we have an available ICU bed here I'd happily admit her here.  Her daughter wants to minimize painful and/or invasive cares though has not yet definitively ruled out dialysis should the need arise though she wants to discuss this further if Nephrology recommends this measure prior to committing.  Currently she does want basic restorative measures, and is ok for frequent lab draws, workup and treatment for chest  pain/PE/ACS etc.  ER Provider is now working to arrange for transfer.    1.  Acute on chronic renal failure with hyperkalemia: cause unclear, will need very close monitoring, possibly ongoing shifting techniques, bicarb gtt etc.  so far manage w/ shifting and lasix and bicarbonate.  May need HD pending response to medical management.      2.  Chest pain w/ elevated troponin and new RBBB: concern highest for submassive PE vs ACS.  Currently hemodynamically stable.  Further workup recommended w/ TTE, V/Q scan and serial troponins, likely initiation of heparin gtt.    3. Chronic issues include HTN, TIA, HLD, hypothyroidism    Non-Billable Note - seen in ED as inpatient admission at [facility] was considered but it appears other providers will be assuming her care.          History of Present Illness:  Brent Bey is a 89 year old female with PMH including HTN, TIA, HLD, hypothyroidism, CKD stage IV who presents with chest pain.  SHe has had chest pain, fatigue, dyspnea and generalized weakness for several days.  These issues have been contstant and gradually progressive.  Chest pain is worse with deep breathing and sharp.  She has had some nausea and vomiting as well, particularly after eating.  She is on plavix and has a hx of TIA.    Here in the ER EKG showed NSR with RBBB, inverted t-waves in V4-V6 new since 1/18/16.      She did undergo CT abdomen/pelvis without contrast which showed new small bilateral pleural effusions and trace free pelvic fluid as well as cardiomegaly but no other acute pathology was noted.    Her potassium was found to be high at 7.0 with Cr of 10.60.  PH was 7.24 with bicarbonate of 17.  Troponin was elevated at 0.750 with d-dimer of 2.4.    She was given D50, insulin and lasix as well as sodium bicarbonate and Dr. Song of nephrology was contacted re: initial plan of care.    I discussed her care at length with her daughter via  phone.     Past Medical History:  Past Medical History  "  Diagnosis Date     Hypertension      Hyperlipidemia      TIA (transient ischaemic attack)    CKD IV    Past Surgical History:  Past Surgical History   Procedure Laterality Date     Head & neck surgery  12/31/11     Total laminectomy of C7 and partial of C6 and T1.     Biopsy  12/31/11     Biopsy and culture of the ventral epidural granulation tissue.     Back surgery  03/12       Family History:  Family History   Problem Relation Age of Onset     Asthma Mother      C.A.D. Brother      MI     Unknown/Adopted Son      car accident     CANCER Daughter      liver      Allergies:  Allergies   Allergen Reactions     Aspirin      Feels like she is floating     Medications:  Prior to Admission Medications   Prescriptions Last Dose Informant Patient Reported? Taking?   Calcium-Magnesium-Vitamin D (CITRACAL CALCIUM+D) 600- MG-MG-UNIT TB24 1/23/2017 at AM Self Yes Yes   Sig: Take 1 tablet by mouth daily    Multiple Vitamins-Minerals (CENTRUM SILVER) per tablet 1/23/2017 at AM Self Yes Yes   Sig: Take 1 tablet by mouth daily.   calcium acetate (PHOSLO) 667 MG CAPS capsule 1/24/2017 at AM Self Yes Yes   Sig: Take 667 mg by mouth 3 times daily (with meals)   clopidogrel (PLAVIX) 75 MG tablet 1/24/2017 at AM Self No Yes   Sig: Take 1 tablet (75 mg) by mouth daily 6/8/16 Pt is due for clinic/lab appt in Dec 2016   levothyroxine (SYNTHROID, LEVOTHROID) 25 MCG tablet 1/24/2017 at AM Self No Yes   Sig: Take 1 tablet (25 mcg) by mouth daily   traMADol (ULTRAM) 50 MG tablet 1/23/2017 at PM Self No Yes   Sig: Take 1-2 tablets ( mg) by mouth every 6 hours as needed for pain   traZODone (DESYREL) 50 MG tablet 1/23/2017 at Bedtime Self No Yes   Sig: Take 1 tablet (50 mg) by mouth nightly as needed for sleep      Facility-Administered Medications: None         Physical Exam:  Blood pressure 131/76, pulse 86, temperature 97.5  F (36.4  C), temperature source Oral, resp. rate 20, height 1.549 m (5' 1\"), weight 44.906 kg (99 lb), " SpO2 100 %.  Wt Readings from Last 1 Encounters:   01/24/17 44.906 kg (99 lb)     Exam:  General: elderly woman, looks uncomfortable and ill, awake and alert.  Looks stated age or somewhat younger.  HEENT: NC/AT, eyes anicteric, external occular movements intact, face symmetric.  Cardiac: RRR, S1, S2.  No murmurs appreciated.  Pulmonary: breathing somewhat labored, Normal chest rise,  Lungs CTA BL  Abdomen: soft, thin, non-tender, non-distended.  Bowel Sounds Present.  No guarding.  Extremities: no deformities.  Warm, well perfused.  Skin: no rashes or lesions noted.  Warm and Dry.  Neuro: No focal deficits noted.  Speech clear.  Coordination and strength grossly normal.  Psych: Appropriate affect.    Data:  EKG:  New RBBB lateral TWI.  Imaging:  Recent Results (from the past 48 hour(s))   Abd/pelvis CT no contrast - Stone Protocol    Narrative    CT ABDOMEN AND PELVIS WITHOUT CONTRAST  1/24/2017  4:07 PM     HISTORY: Acute renal failure.    COMPARISON: CT abdomen/pelvis 4/26/2014.    TECHNIQUE: Axial images are obtained from the lung bases to the  symphysis without oral or IV contrast. Coronal reformatted images are  also generated.  Radiation dose for this scan was reduced using  automated exposure control, adjustment of the mA and/or kV according  to patient size, or iterative reconstruction technique.    FINDINGS:     Small bilateral pleural effusions are new since the prior CT. Pleural  scarring is noted. Heart size is enlarged.    Abdomen: No urinary tract calculi or hydronephrosis. Multiple liver  cysts are present. These are stable to slightly larger than seen on  prior exam but still measure water density most consistent with simple  cysts. The liver is otherwise unremarkable. Gallbladder is  decompressed. The spleen, pancreas and adrenal glands are unremarkable  allowing for the noncontrast technique. No enlarged lymph nodes. Aorta  is calcified without evidence of aneurysm. The bowel is normal in  caliber  without obstruction. No evidence of diverticulitis or  appendicitis. No enlarged lymph nodes.    Pelvis: The bladder is decompressed but otherwise unremarkable.  Atrophic uterus is unremarkable. No adnexal mass or pelvic lymph node  enlargement. There is a trace amount of free pelvic fluid. The rectum  is unremarkable.      Impression    IMPRESSION:  1. No evidence of urinary tract calculi or hydronephrosis. Arterial  vascular calcification in the aorta and renal arteries is noted.  2. New small bilateral pleural effusions and trace amount of free  pelvic fluid possibly related to patient's underlying renal failure.  3. Cardiomegaly.    DEVORA SANTORO MD     Labs:    Recent Labs  Lab 01/24/17  1430   WBC 4.7   HGB 9.3*   HCT 29.3*   MCV 95             NA      134   1/24/2017  NA      138   1/18/2016  NA      144   12/16/2015 CHLORIDE      106   1/24/2017  CHLORIDE      105   1/18/2016  CHLORIDE      113   12/16/2015 BUN      103   1/24/2017  BUN       37   1/18/2016  BUN       34   12/16/2015   POTASSIUM      7.0   1/24/2017  POTASSIUM      4.2   1/18/2016  POTASSIUM      4.7   12/16/2015 CO2       17   1/24/2017  CO2       25   1/18/2016  CO2       23   12/16/2015 CR    10.60   1/24/2017  CR     1.79   1/18/2016  CR     2.03   12/16/2015         Benjy Garcia MD  Hospitalist  [facility]

## 2017-01-24 NOTE — PROGRESS NOTES
Respiratory Therapy Note        Pt was started on BIPAP 12/5 30% at 17:42.  Decreased WOB, and she is tolerating very well.    January 24, 2017 5:44 PM  Daniel Mims

## 2017-01-24 NOTE — ED NOTES
In Triage: ABC's intact. Alert and thrashing. Daughter states pt is complaining of chest discomfort since yesterday. Pt's breathing appears very labored with fast respiratory rate until she was sat forward and placed on 2L O2 nc. Breathing now appears even and unlabored.

## 2017-01-24 NOTE — PROGRESS NOTES
Respiratory Therapy Note        ABG drawn at 16:15 by myself from her right radial artery.  She was on room air.    January 24, 2017 4:15 PM  Daniel Mims

## 2017-01-24 NOTE — ED PROVIDER NOTES
History     Chief Complaint:  Chest Pain      HPI The history is obtained through Armenian language interpretations provided by the patient's daughter. The history is somewhat limited due to the language barrier.     Brent Bey is a 89 year old female with a history of TIA, hypertension and hyperlipidemia who presents via EMS for evaluation of chest pain. For the past several days, the patient has had chest pain with associated shortness of breath, fatigue, and generalized weakness. Her chest pain has been present constantly and is worse with deep breathing. Since her chest pain began the patient has also had some vomiting after eating. The patient also had difficulty sleeping on the night of 1/23/2017 despite taking a sleeping pill. She has no history of intraabdominal surgeries.     Cardiac Risk Factors:  CAD:    Negative  Hypertension:   Positive  Hyperlipidemia:  Positive  Diabetes:   Negative  Tobacco use:   Negative  Gender:   Female  Age:    89  Familial Hx of CAD:  Positive    Allergies:  Aspirin      Medications:    traMADol (ULTRAM) 50 MG tablet  traZODone (DESYREL) 50 MG tablet  clopidogrel (PLAVIX) 75 MG tablet  IBANdronate (BONIVA) 150 MG tablet  levothyroxine (SYNTHROID, LEVOTHROID) 25 MCG tablet  zolpidem (AMBIEN CR) 6.25 MG CR tablet  calcium acetate, Phos Binder, 667 MG TABS  Calcium-Magnesium-Vitamin D (CITRACAL CALCIUM+D) 600- MG-MG-UNIT TB24  Multiple Vitamins-Minerals (CENTRUM SILVER) per tablet    Past Medical History:    Hypertension  Hyperlipidemia  Osteoporosis   CKD, stage 4   TIA     Past Surgical History:    Total laminectomy of C7 and partial of C6 and T1  Biopsy and culture of the ventral epidural granulation tissue  Back surgery     Family History:    CAD - Brother  Asthma - Mother  Cancer, liver - Daughter     Social History:  Tobacco use:    Never smoker  Alcohol use:    Negative  Marital status:       Accompanied to ED by:  Daughter and EMS      Review of Systems  "  Constitutional: Positive for fatigue.   Respiratory: Positive for shortness of breath.    Cardiovascular: Positive for chest pain.   Gastrointestinal: Positive for nausea and vomiting.   Neurological: Positive for weakness.   Psychiatric/Behavioral: Positive for sleep disturbance.   All other systems reviewed and are negative.    Physical Exam   First Vitals:  BP: 131/76 mmHg  Temp: 97.5  F (36.4  C)  Temp src: Oral  Pulse: 86  Resp: 20  SpO2: 100 %  Height: 154.9 cm (5' 1\")  Weight: 44.906 kg (99 lb)     Physical Exam   Constitutional: She is oriented to person, place, and time.   Elderly and frail appearing.   HENT:   Head: Normocephalic and atraumatic.   Right Ear: External ear normal.   Mouth/Throat: Oropharynx is clear and moist.   Eyes: Conjunctivae and EOM are normal. Pupils are equal, round, and reactive to light.   Neck: Normal range of motion. Neck supple. No JVD present.   Cardiovascular: Normal rate, regular rhythm and normal heart sounds.    Pulmonary/Chest: Tachypnea noted. She is in respiratory distress. She has decreased breath sounds.   Abdominal: Soft. Bowel sounds are normal. She exhibits no distension. There is no tenderness. There is no rebound.   Musculoskeletal: Normal range of motion.   Lymphadenopathy:     She has no cervical adenopathy.   Neurological: She is alert and oriented to person, place, and time. She displays normal reflexes. No cranial nerve deficit. She exhibits normal muscle tone. Coordination normal. GCS eye subscore is 3. GCS verbal subscore is 4. GCS motor subscore is 6.   Difficult to assess, daughter states confused for the last few days.   Skin: Skin is warm and dry.   Psychiatric: Her behavior is normal. Judgment normal.   Unable to assess due to language barreir.   Nursing note and vitals reviewed.        Emergency Department Course   ECG (14:49:44):  Indication: Screening for cardiovascular disease.   Rate 79 bpm. CO interval 184 ms. QRS duration 138 ms. QT/QTc " 468/536 ms. P-R-T axes 21 -58 -65.   Interpretation: Normal sinus rhythm, Left axis deviation, Right bundle branch, Inferior infarct age undetermined, Cannot rule out anterior infarct age undetermined, T wave abnormality, Abnormal ECG  New RBBB and inverted T wave in V4-V6 compared to 1/18/16.   Interpreted at 1455 by Dr. Goodwin.      Imaging:  Radiographic findings were communicated with the patient and family who voiced understanding of the findings.    Abd/pelvis Ct no contrast - stone protocol:   IMPRESSION:  1. No evidence of urinary tract calculi or hydronephrosis. Arterial vascular calcification in the aorta and renal arteries is noted.  2. New small bilateral pleural effusions and trace amount of free pelvic fluid possibly related to patient's underlying renal failure.  3. Cardiomegaly.  Preliminary report per radiology.     XR Chest Port:   Pending     Laboratory:  CBC: HGB 9.3 low, WNL (WBC 4.7, )   BMP: Potassium 7.0 high, Carbon dioxide 17 low, Glucose 151 high,  high, Creatinine 10.60 high, GFR Estimate 3 low, Calcium 7.1 low, o/w WNL   Blood gas venous and oxyhgb: pH 7.24 low, pCO2 39 low, pO2 41, Bicarbonate 17 low, FIO2 room air, Oxyhemoglobin 65, Base deficit venous 9.7  Blood gas arterial: pH 7.24 low, pCO2 36, pO2 89, Bicarbonate 15 low, Base deficit 11.2, FIO2 room air   Troponin I 1430: 0.750 high  D dimer quantitative: 2.4 high   Hepatic panel: Bilirubin direct <0.1, Bilirubin total 0.3, Albumin 2.4 low, Protein total 6.4 low, Alkphos 120, ALT 33, AST 55 high  Lipase: 501 high   Glucose by meter 1648: 230 high  UA with Microscopic: Pending     Interventions:  1441 Zofran 4 mg IV   1441 Dilaudid 0.2 mg IV   1606 Proventil 10 mg Nebulization   1617 D50 25 g IV   1617 Insulin 4.5 units IV   1619 Lasix 20 mg IV   1624 Sodium bicarbonate 150 mEq IV   1628 Calcium gluconate 1 g IV     Emergency Department Course:  Patient was brought to the ED via EMS.     Nursing notes and vitals  reviewed.  1419: I performed an exam of the patient as documented above.     1527: I updated and reassessed the patient.     1607: I spoke with Dr. Song of the nephrology service regarding patient's presentation, findings, and plan of care.    1623: I updated and reassessed the patient.     1646: I spoke with Dr. Garcia of the hospitalist service regarding patient's presentation, findings, and plan of care.    1653: I updated and reassessed the patient.     17:55 Dr. Miranda spoke with Dr. Villagran, hospitalist at [facility]. They accepted patient.     18:08 Dr. Miranda rechecked the patient and informed them of the plan.      Impression & Plan      Medical Decision Making:  Brent Bey is a 89 year old female who presents with chest pain. The patient is a difficult historian due to language barrier. The patient is 89 years old and slightly tachypneic and comes in seeming uncomfortable. EKG does show a new right bundle branch block, the cause of which is unclear. Lab tests show significant hyperkalemia with a potassium of 7.0 and acute renal failure with a BUN of 100 and a creatinine of 10. The patient's troponin is positive as well. D dimer is positive as well. Impossible to treat her or evaluate further for PE due to her severe renal failure. We will treat her hyperkalemia. End of life discussion was had with the daughter. There is a significant language barrier. I did discuss this using an . We will attempt to place the patient in a bed in the hospital that can dialyze the patient if this decision is made. I will admit for concerns for acute renal failure, acute non-ST segment elevation MI. We will consider anticoagulation and heparinization pending further evaluation for PE.     Addendum:   The patient presented with chest pain. Labs tests as mentioned before show acute renal failure, hyperkalemia, acidosis, and non-ST segment elevation MI. Discussion about end of life care is ongoing with the daughter.  "Ultimately, would recommend admission. Clinically, feel the patient meets criteria for emergency dialysis due to hyperkalemia, acidosis, and renal failure. Non-contrast CT is pending. Gomez catheter is pending. Care was discussed with Dr. Jonathan Song of nephrology who states to give three amps of bicarb and go ahead and admit to telemetry. He states that he would be able to do dialysis if this is required, though thinks that this is not required for now. We will admit to the hospital and continue resuscitation and treatment for hyperkalemia. Clinical condition is guarded due to age, severe metabolic acidosis,renal failure, and hyperkalemia.     Pt daughter was discussed the patients living will and end of life wishes. We did discuss intubation, CPR, dialysis. Daughter states that her mom said that she should \"  Just let her go\". I did attempt to explain to the daughter the critical nature of her kidney failure, and that dialysis could prolong her life, but may not extend it or prevent pain. Daughter is interested in keeping patient in the hospital here, and was explained that the patient was having short shallow breaths and required BIPAP and may need dialysis, and the appropriate disposition might be the ICU and there are no ICU beds in this hospital. Regardless, daughter does not want to move the patient to another hospital. I discussed the care of this patient with Dr. Benjy Fountain, who came to see the patient in the ER. He explained to the daughter that he was not willing to care of this patient on a simple cardiac bed and he recommended ICU transfer. No ICU beds available at [facility], therefore care investigated into the ICU at the [facility], after 2 hours after my shift ended I signed the patient over to Dr. Miranda pending repeat potassium and discussion with hospitalist and icu staff at the [facility] for potential transfer.    Critical Care time:  Due to the need for treatment of acute renal failure " and multiple medical problems including non-ST segment elevation MI was 60 minutes for this patient excludi ng procedures including treatment of hyperkalemia, and extensive end of life discussion with daughter using Slovenian  on the phone.    Diagnosis:  1. Chest pain  2. Non-ST segment elevation MI  3. New right bundle branch  4. Hyperkalemia with acute renal failure    Plan:  Admit.    IAlberto, am serving as a scribe at 2:19 PM on 1/24/2017 to document services personally performed by Dr. Goodwin, based on my observations and the provider's statements to me.    [facility] EMERGENCY DEPARTMENT      Kervin Goodwin MD  01/25/17 [id number]

## 2017-01-24 NOTE — PHARMACY-ADMISSION MEDICATION HISTORY
Admission medication history interview status for this patient is complete. See [facility] admission navigator for allergy information, prior to admission medications and immunization status.     Medication history interview source(s):Daughter  Medication history resources (including written lists, pill bottles, clinic record):Medication Bottles  Primary pharmacy:[facility], [place], [place]    Changes made to PTA medication list:  Added(1): Calcium Acetate  Deleted(2): Boniva, Zolpidem  Changed(0): None    Actions taken by pharmacist (provider contacted, etc):None     Additional medication history information:  -Patient stopped taking Boniva several months ago    Medication reconciliation/reorder completed by provider prior to medication history? No    For patients on insulin therapy: No (Yes/No)  Lantus/levemir/NPH/Mix 70/30 dose:  _____   in AM/PM  or twice daily   Sliding scale Novolog Y/N  If Yes, do you have a baseline novolog pre-meal dose:  ______units with meals   Patients eat three meals a day:   Y/N     Any Barriers to therapy:  cost of medications/comfortable with giving injections (if applicable)/ comfortable and confident with current diabetes regimen       Prior to Admission medications    Medication Sig Last Dose Taking? Auth Provider   calcium acetate (PHOSLO) 667 MG CAPS capsule Take 667 mg by mouth 3 times daily (with meals) 1/24/2017 at AM Yes Unknown, Entered By History   traMADol (ULTRAM) 50 MG tablet Take 1-2 tablets ( mg) by mouth every 6 hours as needed for pain 1/23/2017 at PM Yes Corine Healy MD   traZODone (DESYREL) 50 MG tablet Take 1 tablet (50 mg) by mouth nightly as needed for sleep 1/23/2017 at Bedtime Yes Corine Healy MD   clopidogrel (PLAVIX) 75 MG tablet Take 1 tablet (75 mg) by mouth daily 6/8/16 Pt is due for clinic/lab appt in Dec 2016 1/24/2017 at AM Yes Corine Healy MD   levothyroxine (SYNTHROID, LEVOTHROID) 25 MCG tablet Take 1 tablet (25 mcg) by mouth daily  1/24/2017 at AM Yes Corine Healy MD   Calcium-Magnesium-Vitamin D (CITRACAL CALCIUM+D) 600- MG-MG-UNIT TB24 Take 1 tablet by mouth daily  1/23/2017 at AM Yes Reported, Patient   Multiple Vitamins-Minerals (CENTRUM SILVER) per tablet Take 1 tablet by mouth daily. 1/23/2017 at AM Yes Reported, Patient

## 2017-01-25 PROBLEM — N18.6 ESRD (END STAGE RENAL DISEASE) (H): Status: ACTIVE | Noted: 2017-01-01

## 2017-01-25 NOTE — IP AVS SNAPSHOT
[address] [address]    Phone:  [phone]                                       After Visit Summary   1/25/2017    Brent Bey    MRN: [id number]           After Visit Summary Signature Page     I have received my discharge instructions, and my questions have been answered. I have discussed any challenges I see with this plan with the nurse or doctor.    ..........................................................................................................................................  Patient/Patient Representative Signature      ..........................................................................................................................................  Patient Representative Print Name and Relationship to Patient    ..................................................               ................................................  Date                                            Time    ..........................................................................................................................................  Reviewed by Signature/Title    ...................................................              ..............................................  Date                                                            Time

## 2017-01-25 NOTE — ED NOTES
Pt resting fairly well, intermittently restless; VSS; cardiac rhythm unchanged; urine output increasing slightly; awaiting transport; daughter at bedside.

## 2017-01-25 NOTE — PROGRESS NOTES
Social Work Services Progress Note  Hospital Day: 0  Collaborated with:  Team rounds; MICU Team; bedside RN; daughter and granddaughter.     Data:    Patient is a 89 year old Slovenian speaking female with medical history including CKD, HTN prior TIA who was admitted with chest pain, worsening breathing, malaise, poor oral intake and recent nausea/vomiting and weakness.    Intervention:    Spoke with MICU team throughout course of the day and was notified that patient does not want aggressive measure including no dialysis; DNR/DNI; etc. Staff have made multiple attempts throughout the day to contact  services who indicated they do not have an  available. Met with patient's daughter, Joseph and granddaughter, Josefa ([phone]). Granddaughter assisted with interpreting. Both indicate and confirm patient does not want aggressive measures and would like to go home. Family also confirms they would like hospice services. Discussed hospices services and philosophy and offered choice in hospice agency. Family open to [facility]. Reviewed options of home hospice, nursing home and residential hospice home. Daughter and granddaughter confirm they would like to take patient home and have the availability to care for her; they declined placement. Family aware hospice is not 24/hr care and very comfortable caring for patient.     Spoke with Maida Hayes, Hospice Liaison (pager: [phone]) who met with the family and completed the hospice consult. Family (daughter and granddaughter) both in agreement with hospice and have signed consents. Hospice admission schedule for tomorrow, Thursday 1/26 in the home at 9:30am. Equipment to be delivered to the home this evening; family will be home to accept the equipment. All d/c meds to be filled and sent home with patient.     Always Prepped Transportation ([phone]) with oxygen scheduled for 8:00pm tonight at family's request for late d/c. PCS  form completed/signed and left with bedside RN. Verified with transport that daughter is able to ride with.     Assessment:    Significant relationship at present time:  Daughter, Joseph and granddaughter, Josefa.   Family of origin is available for support:  Yes; family caring for patient at home.   Other support available:  Extended family.   Gaps in support system:  None; will have 24/hr care my family.     Plan:  Anticipated Disposition:  Home with [facility] this evening.   Barriers to d/c plan:  None.   Follow Up:  None needed; patient to d/c ivan.     COLTON Alexander, [facility]  ICU   Pager: [phone]  Phone: [phone]

## 2017-01-25 NOTE — PROGRESS NOTES
[facility]  Met with pt  family to discuss plans for hospice and answer questions about hospice.  Pt to be discharged home later tonight and has agreed to have [facility] follow with hospice services.   Equipment of Oxygen was ordered to be at the  pt home by 8 pm tonight. Medications will be filled at the discharge pharmacy and sent home with the pt.  Pt and family verbalized understanding  That the completion of the admission visit is scheduled for tomorrow morning at 9:30 am.   The  POLST documentation was not  completed at this time.  The  Hospice consents were signed today by the daughter.  Pt daughter has the 24 hour phone number for  Hospice for any questions or concerns.     Maida Hayes RN Liaison

## 2017-01-25 NOTE — PLAN OF CARE
Problem: Goal Outcome Summary  Goal: Goal Outcome Summary  OT: after conversation with RN and chart review as well as observation of this pt it is concluded that this pt has no acute OT needs. If pt to move to home hospice it would be beneficial for pt to receive home OT for adaptive equipment setup and education in the patients living situation.

## 2017-01-25 NOTE — CONSULTS
[facility]  Palliative Care Consultation         Brent Bey MRN# [id number]   Age: 89 year old YOB: 1926   Date of Admission: 1/25/2017    Reason for consult: Goals of care  Decisional support  Patient and family support       Requesting physician/service: Nannette Yeung MD       Recommendations        Goals of Care  Visited with patient and family including daughter and granddaughter today. Phone  used during initial visit. Patient was not able to interact with phone  due to significant hearing loss. Discussed with family Brent Bey's wishes which are to not have dialysis, not to have CPR and not to have a breathing tube. The family and patient confirm the desire for Brent Bey to go home, to be comfortable, and not to return to the hospital despite having a life limiting illness of ESRD and potential heart disease. Patient's family verbalize plan of managing her symptoms and if she were to die how to proceed in that situation. Discussed again the plan for hospice with all team members including primary MICU team, , and myself with the family and they continue to understand the plan moving forward. They believe that the plan is truly in alignment with her wishes base on the discussions that the patient and her daughter were able to have prior to this hospitalization.   -Initiate comfort cares only and discontinue those things that are do not align with comfort such as lab draws, routine vital signs, neuro checks, glucose monitoring  -Would change code status to DNR/DNI  -Would continue PTA tramadol and trazodone as they were helpful for the patient  -Appreciate  Hospice assistance with hospice arrangements    Hospice PRN orders:  - Bisacodyl 10 mg Suppository KY daily to bid prn constipation  - Tylenol 650 mg suppository PO/KY q4hr prn pain, fever  - Lorazepam  0.25-0.5 mg PO/SL/KY q4h prn anxiety/restlessness  - Atropine sulfate 1% opth  "solution 1-2 drops SL q2h prn secretions  - Senna 8.6 mg 1-2 tabs PO prn constipation  - Haldol 1-2 mg PO/SL/WA q6h prn nausea, agitation or delirium.   - Hydromorphone high concentration solution 2-4 mg PO q2h PRN for dyspnea or pain       POLST -completed 1/25 with daughter    THEO Palencia CNS  Palliative Care Consult Team  Pager: [phone]    180 minutes spent with patient, with >50% counseling and in care coordination.        Disease Process/es & Symptoms     NSTEMI  Cardiomegaly  Mild baseline dementia  Stage 4 CKD, ESRD  History of TIA  Hyperkalemia  Hyperphosphatemia  Hypermagnesemia  Normocytic amenia     Support/Coping   (We typically ask each of our patients the following questions:)    How do you make sense of this? Not discussed  Are you Quaker? Spiritual? Not so much? Not discussed  Are you at peace? Not discussed  What are your concerns, medical and non-medical, that are not being addressed? Not discussed  Who are your \"go-to\" people when you need support? Not discussed    Patient unable to participate in these questions due to language barrier and lack of  available for the day.    Plan for psychosocial/spiritual support/follow-up from palliative team: Will discuss case during palliative interdisciplinary team rounds and involve LICSW and  as needed.       Decision-Making & Goals of Care     Discussion/counseling today about prognosis/goals of care/decisions:   See above  Patient has a completed health care directive available in the chart (Y/N): No  Health care agent (only if patient has an available, complete HCD): by default, daughter Maria D Ruiz  There is no POLST (Physician orders for life-sustaining treatment) form on file for this patient  Code Status: Do not resuscitate   Patient has decision-making capacity (Y/N): MILAD given patient is significantly hard of hearing with a language barrier to which she is unable to communicate due to lack of  today for " reasons of limited availability and inclement weather.    Coordination of Care     Findings & plan of care discussed with: Primary MICU team  Follow-up plan from palliative team: will continue to follow patient  Thank you for involving us in the patient's care.           Chief Complaint     Goals of care         History of Present Illness     History obtained from: Chart review    Mrs. Brent Bey is a 89 year old female admitted with chest pain increased in severity with breathing, malaise, poor oral intake, recent nasuea and vomiting, and weakness with an elevated creatinine of 10.6 with potassium of 7.0 and metabolic acidosis with a new RBBB and troponin of 0.750.    Palliative Care team was consulted  for goals of care, overnight the family does not want dialysis and is DNR, and possible does not want to be intubated (but still thinking about it).          Past Medical History:   I have reviewed this patient's past medical history  This patient  has a past medical history of Hypertension; Hyperlipidemia; and TIA (transient ischaemic attack).           Past Surgical History:   I have reviewed this patient's past surgical history  This patient  has past surgical history that includes Head and neck surgery (11); biopsy (11); and back surgery ().            Social/Spiritual History:     Living situation: lives with daughter  Family system: daughter, son-in-law, granddaughter  Functional status (needs help with ADLs or IADLs): receives assistance with some ADLs from home health aids  Employment/education: not discussed  Use of community resources: has home health care services  Activities/interests: not discussed  History of substance use/abuse: None per chart review            Family History:   I have reviewed this patient's family history  The patient indicated that her mother is . She indicated that her father is . She indicated that all of her three sisters are alive. She  indicated that both of her brothers are . She indicated that her maternal grandmother is . She indicated that her maternal grandfather is . She indicated that her paternal grandmother is . She indicated that her paternal grandfather is . She indicated that only one of her two daughters is alive. She indicated that her son is .               Allergies:   All allergies reviewed and addressed  This patient is allergic to is allergic to aspirin.           Medications:   I have reviewed this patient's medication profile and medications during this hospitalization    Medications of Note  Acetaminophen 650 mg q4h PRN- x0  Ondanstron 4 mg q4h PRN- x0             Review of Systems:   The comprehensive review of systems is negative other than noted here and in the HPI.    Palliative Symptom Review (0=no symptom/no concern, 1=mild, 2=moderate, 3=severe):      MILAD given patient unable to communicate due to language barrier and lack of in person  in patient with significant hearing difficulty            Physical Exam:   Vitals were reviewed  Temp: 97.7  F (36.5  C) Temp src: Axillary BP: 125/67 mmHg   Heart Rate: 82 Resp: 16 SpO2: 96 % O2 Device: None (Room air) Oxygen Delivery: 2 LPM  Constitutional: Awake, alert, no apparent distress  Lungs: No increased work of breathing  Neurologic: Awake, alert, interacts with family appropriately  Neuropsychiatric: MILAD given language barrier and lack of in person  in patient with significant hearing difficulty             Data Reviewed:     ROUTINE ICU LABS (Last four results)  CMP  Recent Labs  Lab 17  0436 17  0027 17  2128 17  1824 17  1430   NA  --  138 137 138 134   POTASSIUM 5.5* 6.0* 6.4* 6.2* 7.0*   CHLORIDE  --  105 104 106 106   CO2  --  22  --  22 17*   ANIONGAP  --  11 12 10 11   GLC  --  124* 126* 84 151*   BUN  --  100* 111* 102* 103*   CR  --  10.80*  --  10.60* 10.60*    GFRESTIMATED  --  3* 3* 3* 3*   GFRESTBLACK  --  4* 4* 4* 4*   SHAWANDA  --  7.3*  --  7.3* 7.1*   MAG 3.5* 3.8*  --   --  3.7*   PHOS 7.5* 8.2*  --   --  8.5*   PROTTOTAL  --   --   --   --  6.4*   ALBUMIN  --   --   --   --  2.4*   BILITOTAL  --   --   --   --  0.3   ALKPHOS  --   --   --   --  120   AST  --   --   --   --  55*   ALT  --   --   --   --  33     CBC  Recent Labs  Lab 01/25/17  0436 01/25/17  0027 01/24/17  2128 01/24/17  1824 01/24/17  1430   WBC 7.3 5.1  --  4.6 4.7   RBC 2.96* 3.03*  --  2.80* 3.08*   HGB 8.8* 9.1* 8.2* 8.5* 9.3*   HCT 26.9* 27.8*  --  25.9* 29.3*   MCV 91 92  --  93 95   MCH 29.7 30.0  --  30.4 30.2   MCHC 32.7 32.7  --  32.8 31.7   RDW 14.5 14.5  --  13.7 14.0    207  --  192 235     INR  Recent Labs  Lab 01/25/17 0027   INR 1.34*     Arterial Blood Gas  Recent Labs  Lab 01/25/17  0027 01/24/17  1824 01/24/17  1615 01/24/17  1532   PH 7.35  --  7.24*  --    PCO2 41  --  35  --    PO2 45*  --  89  --    HCO3 23  --  15*  --    O2PER Unknown 30% Room Air Room Air

## 2017-01-25 NOTE — ED NOTES
Paramedic Benjy at bedside, full report given; care handoff complete.  All belongings given to daughter for transport.

## 2017-01-25 NOTE — ED NOTES
Pt resting in bed; remains restless; drowsy but able to communicate clearly with daughter in Yoruba, speech clear, LS clear bilaterally; remains in bipap; NSR with slight ST depression per pt baseline; VSS; abdomen soft; rodriguez in place, 30 cc clear yellow urine in bag; MD notified of drop in BG and poor urine output.  Pt repositioned; given mouth swabs for comfort; bipap mask repositioned with some relief; see new orders for fluids.  IVs retaped; pt more comfortable now.  Awaiting transfer.  Monitoring pt closely.

## 2017-01-25 NOTE — PHARMACY
Patient is being treated for hyperkalemia.     A pharmacy consult was initiated to review the patient's medication list for possible causes of hyperkalemia.    No medications on this patient's profile are likely to have the side effect of hyperkalemia.     Continue current medication regimen.     Robyn Bai, [email or web address], BCPS

## 2017-01-25 NOTE — IP AVS SNAPSHOT
MRN:[id number]                      After Visit Summary   1/25/2017    Brent Bey    MRN: [id number]           Thank you!     Thank you for choosing [facility] for your care. Our goal is always to provide you with excellent care. Hearing back from our patients is one way we can continue to improve our services. Please take a few minutes to complete the written survey that you may receive in the mail after you visit with us. Thank you!        Patient Information     Date Of Birth          3/30/1926        About your hospital stay     You were admitted on:  January 25, 2017 You last received care in the:  [address] [place] [address]    You were discharged on:  January 25, 2017        Reason for your hospital stay       You were seen for worsening renal failure and NSTEMI. Your and your family's wishes were to stop all cares and prolong quality of life and focus on comfort. Therefore all medications were adjusted for your quality of life.                  Who to Call     For medical emergencies, please call 911.  For non-urgent questions about your medical care, please call your primary care provider or clinic, [phone]          Attending Provider     Provider    Keagan Hidalgo MD       Primary Care Provider Office Phone # Fax #    Corine Lj Healy [phone]       [facility] [address] [place] [address]         When to contact your care team       A hospice team will contact you on 1/26/17. Home Hospice   (Maida Hayes pager [phone]) is whom to contact if there are any questions.                  After Care Instructions     Activity       Your activity upon discharge: activity as tolerated            Diet       Follow this diet upon discharge: Orders Placed This Encounter  Regular Diet Adult            Oxygen Adult       [facility] Oxygen Order 2 liter(s) by nasal cannula as needed. Expected treatment length is indefinite (99 months).. Test on conserving  device as applicable.    Patients who qualify for home O2 coverage under the CMS guidelines require ABG tests or O2 sat readings obtained closest to, but no earlier than 2 days prior to the discharge, as evidence of the need for home oxygen therapy. Testing must be performed while patient is in the chronic stable state. See notes for O2 sats.    I certify that this patient, Brent Bey has been under my care and that I, or a nurse practitioner or physician's assistant working with me, had a face-to-face encounter that meets the face-to-face encounter requirements with this patient on 1/25/2017. The patient, Brent Bey was evaluated or treated in whole, or in part, for the following medical condition, which necessitates the use of the ordered oxygen. Treatment Diagnosis: ESRD    Attending Provider: Keagan Hidalgo MD  Physician signature: See electronic signature associated with these discharge orders  Date of Order: January 25, 2017                  Your next 10 appointments already scheduled     Jan 26, 2017 10:00 AM   [place] [facility] [employer]    Services Department ([facility])    [address] [place] [phone]               Jan 26, 2017  1:00 PM   [place] [facility] [employer] [facility]    Services Department ([facility])    [address] [place] [phone]               Jan 27, 2017  8:30 AM   [place] [facility] [employer] [facility]    Services Department ([facility])    [address] [place] [phone]               Jan 27, 2017  1:00 PM   [place] [facility] [employer] [facility]    Services Department ([facility])    [address] [place] [phone]                Jan 28, 2017  8:30 AM   [place] [facility] [employer] [facility]    Services Department ([facility])    [address] [place] [phone]               Jan 28, 2017  1:00 PM   [facility] [employer] [facility]    Services Department ([facility])    [address] [place] [phone]               Jan 29, 2017  8:30 AM   [facility] [employer] [facility]    Services Department ([facility])    [address] [place] [phone]               Jan 29, 2017  1:00 PM   [place] [facility] with [employer] [employer] [facility]    Services Department ([facility])    [address] [place] [phone]               Jan 30, 2017  8:30 AM   [place] [facility] [facility]    Services Department ([facility])    [address] [place] [phone]               Jan 30, 2017  1:00 PM   [place] [facility] [facility]    Services Department ([facility])    [address] [place] [phone]                 Additional Services     HOSPICE REFERRAL       Order classes of: [facility], [facility] and [facility] will route to the Home Care and Hospice Referral Pool.  Home Care or Hospice will then contact the patient to schedule their appointment.    If you do not hear from Home Care and Hospice, or you would like to call to schedule, please call the referring place of service that your provider has listed below.  ______________________________________________________________________    Your  provider has referred you to: [facility]: [facility] [phone]   [email or web address]    Extended Emergency Contact Information  Primary Emergency Contact: KEHINDE CHURCH  Address: [address] [place] [address] [facility]  Home Phone: [phone]  Work Phone: none  Mobile Phone: [phone]  Relation: Daughter    Patient Anticipated Discharge Date: 1/25/17   RN, PT, HHA to begin 24 - 48 hours after discharge.  PLEASE EVALUATE AND TREAT (Evaluation timeline is 24 - 48 hrs. Please call if there is need for a variance to this timeline).    REASON FOR REFERRAL: Home Based Palliative Care ([place] - terminal disease still being treated) Diagnosis: ESRD, NSTEMI    ADDITIONAL SERVICES NEEDED: SW    OTHER PERTINENT INFORMATION: Patient was last seen by provider on 1/25/17 for VIANNEY on CKD, ESRD, NSTEMI    Current Outpatient Prescriptions:  acetaminophen (TYLENOL) 325 MG tablet, Take 2 tablets (650 mg) by mouth every 4 hours as needed for mild pain, Disp: 100 tablet, Rfl: 1  acetaminophen (TYLENOL) 650 MG Suppository, Place 1 suppository (650 mg) rectally every 4 hours as needed for mild pain, Disp: 60 suppository, Rfl: 1  ondansetron (ZOFRAN-ODT) 4 MG ODT tab, Take 1 tablet (4 mg) by mouth every 6 hours as needed for nausea, Disp: 120 tablet, Rfl: 1  haloperidol (HALDOL) 2 MG/ML (HIGH CONC) solution, Take 1 mL (2 mg) by mouth every 6 hours as needed for agitation, Disp: 60 mL, Rfl: 1  haloperidol (HALDOL) 1 MG tablet, Take 1-2 tablets (1-2 mg) by mouth every 6 hours as needed for agitation, Disp: 15 tablet, Rfl: 1  bisacodyl (DULCOLAX) 10 MG Suppository, Place 1 suppository (10 mg) rectally daily as needed for constipation, Disp: 30 suppository, Rfl: 1  sennosides (SENOKOT) 8.6 MG tablet, Take 1 tablet by mouth 2 times daily as needed for constipation, Disp: 120 each, Rfl: 0  atropine 1 % ophthalmic solution, Take 1-2 drops by mouth, place under  tongue or place inside cheek 2 times daily as needed for secretions, Disp: 1 Bottle, Rfl: 1  HYDROmorphone (DILAUDID HIGH CONCENTRATION) 10 MG/ML LIQD (HIGH CONC) solution, Place 0.1-0.2 mLs (1-2 mg) under the tongue every 2 hours as needed for moderate to severe pain (shortness of breath), Disp: 1 Bottle, Rfl: 0  traMADol (ULTRAM) 50 MG tablet, Take 1 tablet (50 mg) by mouth every 6 hours as needed, Disp: 10 tablet, Rfl: 0  traZODone (DESYREL) 50 MG tablet, Take 1 tablet (50 mg) by mouth nightly as needed for sleep, Disp: 15 tablet, Rfl: 4      Patient Active Problem List:     Benign hypertension     Osteoporosis     Transient cerebral ischemia     Decreased hearing     Back pain     Advanced directives, counseling/discussion     Pulmonary nodule     Hyperlipidemia LDL goal <100     Hypothyroidism     CKD (chronic kidney disease) stage 4, GFR 15-29 ml/min (H)     Insomnia     ESRD (end stage renal disease) (H)      Documentation of Face to Face and Certification for Home Health Services    I certify that patientBrent is under my care and that I, or a Nurse Practitioner or Physician's Assistant working with me, had a face-to-face encounter that meets the physician face-to-face encounter requirements with this patient on: 1/25/2017.    This encounter with the patient was in whole, or in part, for the following medical condition, which is the primary reason for Home Health Care: hospice.    I certify that, based on my findings, the following services are medically necessary Home Health Services: Nursing    My clinical findings support the need for the above services because: Nurse is needed: hospice.    Further, I certify that my clinical findings support that this patient is homebound (i.e. absences from home require considerable and taxing effort and are for medical reasons or [employer] services or infrequently or of short duration when for other reasons) because: hospice    Based on the above findings, I  "certify that this patient is confined to the home and needs intermittent skilled nursing care, physical therapy and/or speech therapy.  The patient is under my care, and I have initiated the establishment of the plan of care.  This patient will be followed by a physician who will periodically review the plan of care.    Physician/Provider to provide follow up care: Corine Healy    Responsible PECOS certified Physician at time of discharge: Dr. Hidalgo     Please be aware that coverage of these services is subject to the terms and limitations of your health insurance plan.  Call member services at your health plan with any benefit or coverage questions.                  Pending Results     No orders found from 1/24/2017 to 1/26/2017.            Statement of Approval     Ordered          01/25/17 1601  I have reviewed and agree with all the recommendations and orders detailed in this document.   EFFECTIVE NOW     Approved and electronically signed by:  Nannette Yeung MD             Admission Information        Provider Department Dept Phone    1/25/2017 Keagan Hidalgo MD  [facility] [phone]      Your Vitals Were     Blood Pressure Temperature Respirations    122/100 mmHg 97.1  F (36.2  C) (Axillary) 20    Height Weight BMI (Body Mass Index)    1.549 m (5' 1\") 49.9 kg (110 lb 0.2 oz) 20.80 kg/m2    Pulse Oximetry          96%        MyChart Information     [email or web address] lets you send messages to your doctor, view your test results, renew your prescriptions, schedule appointments and more. To sign up, go to [email or web address] . Click on \"Log in\" on the left side of the screen, which will take you to the Welcome page. Then click on \"Sign up Now\" on the right side of the page.     You will be asked to enter the access code listed below, as well as some personal information. Please follow the directions to create your username and password.     Your access code is: [email or web address]  Expires: 4/25/2017  " 7:21 PM     Your access code will  in 90 days. If you need help or a new code, please call your [facility] clinic or [phone].        Care EveryWhere ID     This is your Care EveryWhere ID. This could be used by other organizations to access your [facility] medical records  [id number]           Review of your medicines      START taking        Dose / Directions    * acetaminophen 325 MG tablet   Commonly known as:  TYLENOL   Used for:  ESRD (end stage renal disease) (H)        Dose:  650 mg   Take 2 tablets (650 mg) by mouth every 4 hours as needed for mild pain   Quantity:  100 tablet   Refills:  1       * acetaminophen 650 MG Suppository   Commonly known as:  TYLENOL   Used for:  ESRD (end stage renal disease) (H)        Dose:  650 mg   Place 1 suppository (650 mg) rectally every 4 hours as needed for mild pain   Quantity:  60 suppository   Refills:  1       atropine 1 % ophthalmic solution   Used for:  ESRD (end stage renal disease) (H)        Dose:  1-2 drop   Take 1-2 drops by mouth, place under tongue or place inside cheek 2 times daily as needed for secretions   Quantity:  1 Bottle   Refills:  1       bisacodyl 10 MG Suppository   Commonly known as:  DULCOLAX   Used for:  ESRD (end stage renal disease) (H)        Dose:  10 mg   Place 1 suppository (10 mg) rectally daily as needed for constipation   Quantity:  30 suppository   Refills:  1       haloperidol 1 MG tablet   Commonly known as:  HALDOL   Used for:  ESRD (end stage renal disease) (H)        Dose:  1-2 mg   Take 1-2 tablets (1-2 mg) by mouth every 6 hours as needed for agitation   Quantity:  15 tablet   Refills:  1       haloperidol 2 MG/ML (HIGH CONC) solution   Commonly known as:  HALDOL   Used for:  ESRD (end stage renal disease) (H)        Dose:  2 mg   Take 1 mL (2 mg) by mouth every 6 hours as needed for agitation   Quantity:  60 mL   Refills:  1       HYDROmorphone 10 MG/ML Liqd (HIGH CONC) solution   Commonly known as:  DILAUDID high  concentration   Used for:  ESRD (end stage renal disease) (H)        Dose:  1-2 mg   Place 0.1-0.2 mLs (1-2 mg) under the tongue every 2 hours as needed for moderate to severe pain (shortness of breath)   Quantity:  1 Bottle   Refills:  0       ondansetron 4 MG ODT tab   Commonly known as:  ZOFRAN-ODT   Used for:  ESRD (end stage renal disease) (H)        Dose:  4 mg   Take 1 tablet (4 mg) by mouth every 6 hours as needed for nausea   Quantity:  120 tablet   Refills:  1       sennosides 8.6 MG tablet   Commonly known as:  SENOKOT   Used for:  ESRD (end stage renal disease) (H)        Dose:  1 tablet   Take 1 tablet by mouth 2 times daily as needed for constipation   Quantity:  120 each   Refills:  0       * Notice:  This list has 2 medication(s) that are the same as other medications prescribed for you. Read the directions carefully, and ask your doctor or other care provider to review them with you.      CONTINUE these medicines which may have CHANGED, or have new prescriptions. If we are uncertain of the size of tablets/capsules you have at home, strength may be listed as something that might have changed.        Dose / Directions    * traMADol 50 MG tablet   Commonly known as:  ULTRAM   This may have changed:  Another medication with the same name was added. Make sure you understand how and when to take each.   Used for:  Other chronic pain        Dose:   mg   Take 1-2 tablets ( mg) by mouth every 6 hours as needed for pain   Quantity:  30 tablet   Refills:  0       * traMADol 50 MG tablet   Commonly known as:  ULTRAM   This may have changed:  You were already taking a medication with the same name, and this prescription was added. Make sure you understand how and when to take each.   Used for:  ESRD (end stage renal disease) (H)        Dose:  50 mg   Take 1 tablet (50 mg) by mouth every 6 hours as needed   Quantity:  10 tablet   Refills:  0       * Notice:  This list has 2 medication(s) that are the  same as other medications prescribed for you. Read the directions carefully, and ask your doctor or other care provider to review them with you.      CONTINUE these medicines which have NOT CHANGED        Dose / Directions    calcium acetate 667 MG Caps capsule   Commonly known as:  PHOSLO        Dose:  667 mg   Take 667 mg by mouth 3 times daily (with meals)   Refills:  0       CENTRUM SILVER per tablet        Dose:  1 tablet   Take 1 tablet by mouth daily.   Refills:  0       CITRACAL CALCIUM+D 600- MG-MG-UNIT Tb24   Generic drug:  Calcium-Magnesium-Vitamin D        Dose:  1 tablet   Take 1 tablet by mouth daily   Refills:  0       clopidogrel 75 MG tablet   Commonly known as:  PLAVIX   Used for:  History of TIAs        Dose:  75 mg   Take 1 tablet (75 mg) by mouth daily 6/8/16 Pt is due for clinic/lab appt in Dec 2016   Quantity:  90 tablet   Refills:  2       levothyroxine 25 MCG tablet   Commonly known as:  SYNTHROID/LEVOTHROID   Used for:  Other specified hypothyroidism        Dose:  25 mcg   Take 1 tablet (25 mcg) by mouth daily   Quantity:  90 tablet   Refills:  3       traZODone 50 MG tablet   Commonly known as:  DESYREL   Used for:  Primary insomnia        Dose:  50 mg   Take 1 tablet (50 mg) by mouth nightly as needed for sleep   Quantity:  15 tablet   Refills:  4            Where to get your medicines      These medications were sent to [facility] - [place], [place] - [address]  [address] [address]     Phone:  [phone]    - acetaminophen 325 MG tablet  - acetaminophen 650 MG Suppository  - atropine 1 % ophthalmic solution  - bisacodyl 10 MG Suppository  - haloperidol 1 MG tablet  - haloperidol 2 MG/ML (HIGH CONC) solution  - ondansetron 4 MG ODT tab  - sennosides 8.6 MG tablet  - traZODone 50 MG tablet      Some of these will need a paper prescription and others can be bought over the counter. Ask your nurse if you have questions.     Bring a  paper prescription for each of these medications    - HYDROmorphone 10 MG/ML Liqd (HIGH CONC) solution  - traMADol 50 MG tablet             Protect others around you: Learn how to safely use, store and throw away your medicines at www.disposemymeds.org.             Medication List: This is a list of all your medications and when to take them. Check marks below indicate your daily home schedule. Keep this list as a reference.      Medications           Morning Afternoon Evening Bedtime As Needed    * acetaminophen 325 MG tablet   Commonly known as:  TYLENOL   Take 2 tablets (650 mg) by mouth every 4 hours as needed for mild pain                                * acetaminophen 650 MG Suppository   Commonly known as:  TYLENOL   Place 1 suppository (650 mg) rectally every 4 hours as needed for mild pain                                atropine 1 % ophthalmic solution   Take 1-2 drops by mouth, place under tongue or place inside cheek 2 times daily as needed for secretions                                bisacodyl 10 MG Suppository   Commonly known as:  DULCOLAX   Place 1 suppository (10 mg) rectally daily as needed for constipation                                calcium acetate 667 MG Caps capsule   Commonly known as:  PHOSLO   Take 667 mg by mouth 3 times daily (with meals)                                CENTRUM SILVER per tablet   Take 1 tablet by mouth daily.                                CITRACAL CALCIUM+D 600- MG-MG-UNIT Tb24   Take 1 tablet by mouth daily   Generic drug:  Calcium-Magnesium-Vitamin D                                clopidogrel 75 MG tablet   Commonly known as:  PLAVIX   Take 1 tablet (75 mg) by mouth daily 6/8/16 Pt is due for clinic/lab appt in Dec 2016                                haloperidol 1 MG tablet   Commonly known as:  HALDOL   Take 1-2 tablets (1-2 mg) by mouth every 6 hours as needed for agitation                                haloperidol 2 MG/ML (HIGH CONC) solution   Commonly  known as:  HALDOL   Take 1 mL (2 mg) by mouth every 6 hours as needed for agitation                                HYDROmorphone 10 MG/ML Liqd (HIGH CONC) solution   Commonly known as:  DILAUDID high concentration   Place 0.1-0.2 mLs (1-2 mg) under the tongue every 2 hours as needed for moderate to severe pain (shortness of breath)   Last time this was given:  2 mg on 1/25/2017  5:53 PM                                levothyroxine 25 MCG tablet   Commonly known as:  SYNTHROID/LEVOTHROID   Take 1 tablet (25 mcg) by mouth daily                                ondansetron 4 MG ODT tab   Commonly known as:  ZOFRAN-ODT   Take 1 tablet (4 mg) by mouth every 6 hours as needed for nausea                                sennosides 8.6 MG tablet   Commonly known as:  SENOKOT   Take 1 tablet by mouth 2 times daily as needed for constipation                                * traMADol 50 MG tablet   Commonly known as:  ULTRAM   Take 1-2 tablets ( mg) by mouth every 6 hours as needed for pain                                * traMADol 50 MG tablet   Commonly known as:  ULTRAM   Take 1 tablet (50 mg) by mouth every 6 hours as needed                                traZODone 50 MG tablet   Commonly known as:  DESYREL   Take 1 tablet (50 mg) by mouth nightly as needed for sleep                                * Notice:  This list has 4 medication(s) that are the same as other medications prescribed for you. Read the directions carefully, and ask your doctor or other care provider to review them with you.

## 2017-01-25 NOTE — H&P
"[facility] History and Physical    Brent Bey MRN# [id number]   Age: 89 year old YOB: 1926     Date of Admission:  1/25/2017    Primary care provider: Corine Healy     Assessment and Plan:    Ms. Brent Bey is a 89 years old Latvian speaking female w/ PMHx significant for CKD w/ baseline Cr around 1.8-1.9 as of one year ago, HTN, prior TIA admitted with chest pain worse with breathing, malaise, poor oral intake and recent nausea/vomiting and weakness found to have a Cr of 10.60 with K of 7.0 and metabolic acidosis as well as new RBBB on EKG and troponin of 0.750. Her daughter wants to minimize painful procedures specifically discussed CPR, intubation, and dialysis.      NEURO: Concerned for mild baseline dementia.  Daughter says she does not remember things wells.  No acute changes.  Normal neuro exam.    PULMONARY:  Patient was requiring BIPAP at outside hospital 2/2 dyspnea and chest \"pressure\", was switched to nasal cannula since admission here.  Patient breathing comfortably and maintaining saturation at 100%. CXR findings suggestive of congestive heart failure and this seems likely in the setting of ESRD with fluid retention. Considered PE but patient is normal vitals, comfortable on room air and is now free of chest pain/pressure and no longer experiencing dyspnea. Patient has chronic productive cough this is unchanged.  - O2 by nasal cannula.      CV:  Patient did have chest pressure and dyspnea at presentation to outside hospital with elevated troponin at 0.750 and T-wave inversion V4-V6 and was started on heparin.  The pain and dyspnea has since resolved but recheck of troponin is 1.809 and EKG still showing T-wave inversion.    #NSTEMI:  - Continued heparin infusion (bolus received at OSH)  - Continue home Plavix    # Cardiomegaly: noted on CXR, consider Echocardiogram in the morning pending goals of care and urine ouput    #Hx of TIA   - Continue home plavix    RENAL:Patient has " stage 4 CKD at baseline.  Kidney function has acutely worsened most recent Creatinine at 10.8,  and patient has very little urine ouput.  Patient's daughter was not interested in dialysis and is understanding that this may be the only way to improve the patients status.  Will attempt medical management at this time.   - Fluid challenge in AM. Unclear reason for worsening of kidney function more recently, further work up needed   - Renal Consult     #ESRD:   -Bumex 2mg as diuresis challenge  -Strict I/O    Electrolytes:  Hyperkalemia: potassium has gradually been decreasing most recently was 6.0, will continue to monitor q4hr and use insulin/dextrose as needed.  Also did give Bumex 2mg to attempt diuresis.  Will monitor urine output.  - s/p insulin/dextrose for hyperkalemia on admit, with improvement    Hyperphosphatemia: continue to monitor  - Restart home phoslo when no longer NPO    Hypermagnesia: continue to monitor    ID:  No concerns at this time    Microbiology: none    Antibiotics: none    GI:  Patient has had nausea/vomiting for a few days with meals.  Also complaints of right sided and epigastric abdominal pain.  Had abdominal CT at outside hospital that did not show any acute processes and patient currently comfortable. Minimally elevated AST at 55 and lipase at 501.  This is likely secondary to ESRD and will not workup further at this time if patient remains stable in this regard.    ENDO:  No acute issues    HEME/ONC:      #Normocytc anemia:  hemoglobin stable at 9.1 since presentation to hospital but is new since 1/18/16 hemoglobin 12.6.  Blood loss seems unlikely, no known source.  No melena or bright red blood.  Consider hemolysis as possibility would order peripheral smear, LDH, haptoglobin if pursuing this.  Dilutional with fluid retention 2/2 ESRD is also a possibility with decreased in Hgb, WBC, and platelets from patients baseline.  Since stable further work-up can be deferred upon  "discussion of goals of care during the day, patients daughter deferring dialysis at this time.    RHEUM/MSK:  No acute issues    FEN/GI:  Lines:   Peripheral IV 01/24/17 Left (Active)    Number of days:0         Peripheral IV 01/24/17 Left Lower forearm (Active)    Number of days:0      DVT PPx: heparin drip  GI ulcer PPx:   none    Disposition:    Family: Daugher at bedside and updated    Code Status:  DNR. On admission, daughter reports patient would want to be intubated. Yet, she on second thought said she would like to leave her as \"intubate\" for now, but talk to extended family and address this again if needed acutely.     GOALS - Daughter reports that her mother has reported not wanting aggressive care. She would not want hemodialysis.     Seen with [facility] Sw[facility].   Dr. Meghan Verdugo. Exam reflects my own exam.   Staffed with Dr. Fonseca overnight and with Dr. Hidalgo in AM.                    Chief Complaint:   \"Chest pain and renal failure\"     Indication for critical care admission:  ESRD and non-STEMI     History is obtained from the patient, electronic health record and patient's daughter     History was obtained from the patient and medical records.     Ms. Brent Bey is a 89 year old with PMH significant for CKD, HTN, TIA, hypothyroidism, HLD who presented to ED at OSH due to nausea/vomiting and weakness and found to have VIANNEY on CKD with potassium of 7 and metabolic acidosis and was brought to [facility] ICU for further evaluation.      From OSH:  \"Brent Bey is a 89 year old female with PMH including HTN, TIA, HLD, hypothyroidism, CKD stage IV who presents with chest pain.  She has had chest pain, fatigue, dyspnea and generalized weakness for several days.  These issues have been contstant and gradually progressive.  Chest pain is worse with deep breathing and sharp.  She has had some nausea and vomiting as well, particularly after eating.  She is on plavix and has a hx of TIA.    Here in the ER EKG showed " NSR with RBBB, inverted t-waves in V4-V6 new since 1/18/16.      She did undergo CT abdomen/pelvis without contrast which showed new small bilateral pleural effusions and trace free pelvic fluid as well as cardiomegaly but no other acute pathology was noted.    Her potassium was found to be high at 7.0 with Cr of 10.60.  PH was 7.24 with bicarbonate of 17.  Troponin was elevated at 0.750 with d-dimer of 2.4.    She was given D50, insulin and lasix as well as sodium bicarbonate and Dr. Song of nephrology was contacted re: initial plan of care.    Ms. Brent Bey is a 89 years old Slovak speaking female w/ PMHx significant for CKD w/ baseline Cr around 1.8-1.9 as of one year ago, HTN, prior TIA admitted with chest pain worse with breathing, malaise, poor oral intake and recent nausea/vomiting and weakness found to have a Cr of 10.60 with K of 7.0 and metabolic acidosis as well as new RBBB on EKG and troponin of 0.750.  I did see the patient in the ER as admission to [facility] was anticipated and in particular discussed her findings so far with her daughter via  phone.  I am concerned about sub-massive to massive PE vs ACS as a cause of her chest pain and also obviously her renal failure/hyperkalemia is a pressing issue as well.   At this time she does want further evaluation for potentially reversible conditions and I discussed this likely would mean echocardiogram, V/Q scan, likely heparin, careful management of her renal failure medically but also potentially with hemodialysis and nephrology consultation. At this point I feel ICU admission is certainly warranted for close monitoring and to expedite these numerous measures but unfortunately we do not have ICU beds available at [facility] due to full census.  She is agreeable to transfer to [facility] or alternatively if we do find we have an available ICU bed here I'd happily admit her here.  Her daughter wants to minimize painful and/or invasive  "cares though has not yet definitively ruled out dialysis should the need arise though she wants to discuss this further if Nephrology recommends this measure prior to committing.  Currently she does want basic restorative measures, and is ok for frequent lab draws, workup and treatment for chest pain/PE/ACS etc.  ER Provider is now working to arrange for transfer.\"    In the ED, patient was afebrile with mildly tachycardia up to 100/min, mildly hypertensive up to 158/86mmHg, however, she was hypoxic and in respiratory distress and thus, was put on BiPAP before to transfer. Patient received lasix 20mg IV, albuterol nebs, 25gm of dextrose and 4.5 units of regular insulin. She was also started on heparin gtt, dilaudid 0.2mg IV, Zofran 5mg IV and sodium bicarb 150mEq.           Past Medical History:     Past Medical History   Diagnosis Date     Hypertension      Hyperlipidemia      TIA (transient ischaemic attack)           Past Surgical History:     Past Surgical History   Procedure Laterality Date     Head & neck surgery  12/31/11     Total laminectomy of C7 and partial of C6 and T1.     Biopsy  12/31/11     Biopsy and culture of the ventral epidural granulation tissue.     Back surgery  03/12             Social History:     Social History   Substance Use Topics     Smoking status: Never Smoker      Smokeless tobacco: Never Used     Alcohol Use: No             Family History:     Family History   Problem Relation Age of Onset     Asthma Mother      C.A.D. Brother      MI     Unknown/Adopted Son      car accident     CANCER Daughter      liver      Family history reviewed          Immunizations:     Immunization History   Administered Date(s) Administered     Influenza (High Dose) 3 valent vaccine 09/23/2013     Pneumococcal (PCV 13) 12/16/2015     Pneumococcal 23 valent 03/26/2013     TDAP (ADACEL AGES 11-64) 07/08/2014           Allergies:     Allergies   Allergen Reactions     Aspirin      Feels like she is floating "             Medications:     Prescriptions prior to admission   Medication Sig Dispense Refill Last Dose     calcium acetate (PHOSLO) 667 MG CAPS capsule Take 667 mg by mouth 3 times daily (with meals)   1/24/2017 at AM     traMADol (ULTRAM) 50 MG tablet Take 1-2 tablets ( mg) by mouth every 6 hours as needed for pain 30 tablet 0 1/23/2017 at PM     traZODone (DESYREL) 50 MG tablet Take 1 tablet (50 mg) by mouth nightly as needed for sleep 30 tablet 4 1/23/2017 at Bedtime     clopidogrel (PLAVIX) 75 MG tablet Take 1 tablet (75 mg) by mouth daily 6/8/16 Pt is due for clinic/lab appt in Dec 2016 90 tablet 2 1/24/2017 at AM     levothyroxine (SYNTHROID, LEVOTHROID) 25 MCG tablet Take 1 tablet (25 mcg) by mouth daily 90 tablet 3 1/24/2017 at AM     Calcium-Magnesium-Vitamin D (CITRACAL CALCIUM+D) 600- MG-MG-UNIT TB24 Take 1 tablet by mouth daily    1/23/2017 at AM     Multiple Vitamins-Minerals (CENTRUM SILVER) per tablet Take 1 tablet by mouth daily.   1/23/2017 at AM             Review of Systems:   CONSTITUTIONAL:  negative  EYES:  negative for  double vision and blurred vision  RESPIRATORY:  positive for  cough with sputum (chronic)  CARDIOVASCULAR:  positive for  dyspnea, palpitations, exertional chest pressure/discomfort (now resolved)  negative for  edema, syncope  GASTROINTESTINAL:  positive for nausea, vomiting, abdominal pain and pruritus  negative for change in bowel habits, diarrhea, constipation and abdominal distention  GENITOURINARY:  positive for decreased frequency  negative for dysuria, urinary incontinence and hematuria  INTEGUMENT/BREAST:  negative for rash and skin lesion(s)  MUSCULOSKELETAL:  positive for  Arthralgias chronic  NEUROLOGICAL:  positive for dizziness and generalized weakness  negative for speech problems, visual disturbance, coordination problems, numbness and tingling          Physical Exam:   Temp: 97.6  F (36.4  C) Temp src: Axillary BP: (!) 143/93 mmHg   Heart Rate: 86  Resp: 24 SpO2: 100 % O2 Device: Nasal cannula Oxygen Delivery: 2 LPM    EXAM:  Constitutional: alert and no distress, tolerating bipap, tolerates change to NC  Cardiovascular: regular rate and rhythm, S1 normal, S2 normal, no murmurs, clicks, or gallops  Respiratory: negative findings: normal respiratory rate and rhythm, positive findings: rales bilateral bases  Psychiatric: mentation appears normal and affect normal  Head: Normocephalic. No masses, lesions, tenderness or abnormalities  ENT: throat normal without erythema or exudate  Abdomen: soft, non-distended,   NEURO: alert, moving upper and lower extremities symmetrically  SKIN: no lesions or rashes. Ecchymosis (approximately 5cm in diameter on midline upper back)   JOINT/EXTREMITIES: extremities normal- no gross deformities noted.  No peripheral edema.          Data:     Results for orders placed or performed during the hospital encounter of 01/25/17 (from the past 24 hour(s))   Glucose by meter   Result Value Ref Range    Glucose 121 (H) 70 - 99 mg/dL   EKG 12-lead   Result Value Ref Range    Interpretation ECG Click View Image link to view waveform and result    CBC with platelets   Result Value Ref Range    WBC 5.1 4.0 - 11.0 10e9/L    RBC Count 3.03 (L) 3.8 - 5.2 10e12/L    Hemoglobin 9.1 (L) 11.7 - 15.7 g/dL    Hematocrit 27.8 (L) 35.0 - 47.0 %    MCV 92 78 - 100 fl    MCH 30.0 26.5 - 33.0 pg    MCHC 32.7 31.5 - 36.5 g/dL    RDW 14.5 10.0 - 15.0 %    Platelet Count 207 150 - 450 10e9/L   Magnesium   Result Value Ref Range    Magnesium 3.8 (H) 1.6 - 2.3 mg/dL   Phosphorus   Result Value Ref Range    Phosphorus 8.2 (H) 2.5 - 4.5 mg/dL   INR   Result Value Ref Range    INR 1.34 (H) 0.86 - 1.14   Troponin I   Result Value Ref Range    Troponin I ES 1.809 (HH) 0.000 - 0.045 ug/L   Basic metabolic panel   Result Value Ref Range    Sodium 138 133 - 144 mmol/L    Potassium 6.0 (H) 3.4 - 5.3 mmol/L    Chloride 105 94 - 109 mmol/L    Carbon Dioxide 22 20 - 32  mmol/L    Anion Gap 11 3 - 14 mmol/L    Glucose 124 (H) 70 - 99 mg/dL    Urea Nitrogen 100 (H) 7 - 30 mg/dL    Creatinine 10.80 (H) 0.52 - 1.04 mg/dL    GFR Estimate 3 (L) >60 mL/min/1.7m2    GFR Estimate If Black 4 (L) >60 mL/min/1.7m2    Calcium 7.3 (L) 8.5 - 10.1 mg/dL   Calcium ionized whole blood   Result Value Ref Range    Calcium Ionized Whole Blood 3.9 (L) 4.4 - 5.2 mg/dL   Blood gas arterial   Result Value Ref Range    pH Arterial 7.35 7.35 - 7.45 pH    pCO2 Arterial 41 35 - 45 mm Hg    pO2 Arterial 45 (L) 80 - 105 mm Hg    Bicarbonate Arterial 23 21 - 28 mmol/L    Base Deficit Art 2.8 mmol/L    FIO2 Unknown    Magnesium   Result Value Ref Range    Magnesium 3.5 (H) 1.6 - 2.3 mg/dL   Phosphorus   Result Value Ref Range    Phosphorus 7.5 (H) 2.5 - 4.5 mg/dL   Potassium   Result Value Ref Range    Potassium 5.5 (H) 3.4 - 5.3 mmol/L   CBC with platelets   Result Value Ref Range    WBC 7.3 4.0 - 11.0 10e9/L    RBC Count 2.96 (L) 3.8 - 5.2 10e12/L    Hemoglobin 8.8 (L) 11.7 - 15.7 g/dL    Hematocrit 26.9 (L) 35.0 - 47.0 %    MCV 91 78 - 100 fl    MCH 29.7 26.5 - 33.0 pg    MCHC 32.7 31.5 - 36.5 g/dL    RDW 14.5 10.0 - 15.0 %    Platelet Count 215 150 - 450 10e9/L   Troponin I   Result Value Ref Range    Troponin I ES 1.528 (HH) 0.000 - 0.045 ug/L      EKG results:   Performed today        Rate: 84  Normal sinus rhythm  Prolonged QT interval  Inverted T waves in leads V4, V5 and V6     CT abdomen pelvis:  IMPRESSION:  1. No evidence of urinary tract calculi or hydronephrosis. Arterial  vascular calcification in the aorta and renal arteries is noted.  2. New small bilateral pleural effusions and trace amount of free  pelvic fluid possibly related to patient's underlying renal failure.  3. Cardiomegaly.    CXR  IMPRESSION: Marked cardiomegaly, worse. New left pleural effusion. New  interstitial infiltrates in both lungs and new small right pleural  effusion. The findings probably indicate congestive heart  failure.  Calcified granulomas in the upper lungs are unchanged.

## 2017-01-25 NOTE — DISCHARGE SUMMARY
Medicine Discharge Summary  Brent Bey MRN: [id number]  3/30/1926  Primary care provider: Corine Healy  ___________________________________          Date of Admission:  1/25/2017  Date of Discharge:  1/25/2017   Admitting Physician:  You Fonseca MD  Discharge Physician:  Keagan Hidalgo MD  Discharging Service:  ICU     Primary Provider: Corine Healy         Reason for Admission:   Emergent dialysis           Discharge Diagnosis:   VIANNEY on CKD  Hyperkalemia  NSTEMI          Procedures & Significant Findings:   ECHO pending at time of discharge          Consultations:   Palliative  Hospice          Hospital Course by Problem:      Ms. Brent Bey is a 89 year old Romansh speaking female w/ PMHx significant for CKD (baseline Cr appx 1.8-1.9 as of 1/2016), HTN, prior TIA admitted to the MICU from OSH with chest pain worse with breathing, malaise, poor oral intake and recent nausea/vomiting and weakness found to have a Cr of 10.60 with K of 7.0 and metabolic acidosis as well as new RBBB on EKG and troponin of 0.750 concerning for NSTEMI.     Upon admission on 1/24/17 patient and her family communicated with admitting team that they did not want dialysis and wanted to be DNR.     On 1/25/17 this was reiterated to the day team, by patient family/patient that patient did not like to undergo dialysis, intubation, or receive CPR if indicated and in general did not want more cares in the hospital and wished to go home.     Palliative care was consulted and again communicated with patient and family the grave prognosis of not undergoing dialysis as well as uncertainly of course of the NSTEMI and pending ECHO. Family reiterated that patient does not wish for these measures and that patient understands if she was to go home she would pass away from these processes. Family interpreted for patient as there was communication difficulty with the  "patient given profound hearing loss and language barrier (conversation was carried out with patient daughter, granddaughter who spoke proficient english as in person  services could not be provided by language services department at [facility] despite repeated requests by the MICU/Palliative team). Again it was felt albeit the communication barriers, family communicated with patient regarding grave prognosis and patient understood this prognosis and wished to pass away at home and \"be comfortable and go to heaven\".    Thus, patient was made comfort cares and family and patient chose home hospice. Hospice coordinator (Maida Hayes) met with patient and her family and communicated via the blue telephone  services and patient consented for home hospice and deescalating of cares while in the hospital and pursuing a comfort based approach.     Family thanked medical staff for patient's cares while in hospital and for honoring patient's wishes.     Physical Exam on day of Discharge:  Blood pressure 122/100, temperature 97.7  F (36.5  C), temperature source Axillary, resp. rate 20, height 1.549 m (5' 1\"), weight 49.9 kg (110 lb 0.2 oz), SpO2 96 %.     General: Elderly female lying in hospital bed. Tired-appearing. Difficulty with attempted communication during examination given hearing loss and no english speaking.   HEENT: EOMI, MMM  CV: RRR, nl S1/S2, no S3/S4, no m/r/g; peripheral pulses intact.   Resp: equal b/l air entry, no wheezing/crackles  Abd: soft, ND, NT, NABS  Extremities: WWP           Pending Results:   ECHO         Discharge Medications:     Current Discharge Medication List      START taking these medications    Details   acetaminophen (TYLENOL) 325 MG tablet Take 2 tablets (650 mg) by mouth every 4 hours as needed for mild pain  Qty: 100 tablet, Refills: 1    Associated Diagnoses: ESRD (end stage renal disease) (H)      acetaminophen (TYLENOL) 650 MG Suppository Place 1 suppository (650 " mg) rectally every 4 hours as needed for mild pain  Qty: 60 suppository, Refills: 1    Associated Diagnoses: ESRD (end stage renal disease) (H)      ondansetron (ZOFRAN-ODT) 4 MG ODT tab Take 1 tablet (4 mg) by mouth every 6 hours as needed for nausea  Qty: 120 tablet, Refills: 1    Associated Diagnoses: ESRD (end stage renal disease) (H)      haloperidol (HALDOL) 2 MG/ML (HIGH CONC) solution Take 1 mL (2 mg) by mouth every 6 hours as needed for agitation  Qty: 60 mL, Refills: 1    Associated Diagnoses: ESRD (end stage renal disease) (H)      haloperidol (HALDOL) 1 MG tablet Take 1-2 tablets (1-2 mg) by mouth every 6 hours as needed for agitation  Qty: 15 tablet, Refills: 1    Associated Diagnoses: ESRD (end stage renal disease) (H)      bisacodyl (DULCOLAX) 10 MG Suppository Place 1 suppository (10 mg) rectally daily as needed for constipation  Qty: 30 suppository, Refills: 1    Associated Diagnoses: ESRD (end stage renal disease) (H)      sennosides (SENOKOT) 8.6 MG tablet Take 1 tablet by mouth 2 times daily as needed for constipation  Qty: 120 each, Refills: 0    Associated Diagnoses: ESRD (end stage renal disease) (H)      atropine 1 % ophthalmic solution Take 1-2 drops by mouth, place under tongue or place inside cheek 2 times daily as needed for secretions  Qty: 1 Bottle, Refills: 1    Associated Diagnoses: ESRD (end stage renal disease) (H)      HYDROmorphone (DILAUDID HIGH CONCENTRATION) 10 MG/ML LIQD (HIGH CONC) solution Place 0.1-0.2 mLs (1-2 mg) under the tongue every 2 hours as needed for moderate to severe pain (shortness of breath)  Qty: 1 Bottle, Refills: 0    Associated Diagnoses: ESRD (end stage renal disease) (H)      !! traMADol (ULTRAM) 50 MG tablet Take 1 tablet (50 mg) by mouth every 6 hours as needed  Qty: 10 tablet, Refills: 0    Associated Diagnoses: ESRD (end stage renal disease) (H)       !! - Potential duplicate medications found. Please discuss with provider.      CONTINUE these  medications which have CHANGED    Details   traZODone (DESYREL) 50 MG tablet Take 1 tablet (50 mg) by mouth nightly as needed for sleep  Qty: 15 tablet, Refills: 4    Associated Diagnoses: Primary insomnia         CONTINUE these medications which have NOT CHANGED    Details   calcium acetate (PHOSLO) 667 MG CAPS capsule Take 667 mg by mouth 3 times daily (with meals)      !! traMADol (ULTRAM) 50 MG tablet Take 1-2 tablets ( mg) by mouth every 6 hours as needed for pain  Qty: 30 tablet, Refills: 0    Associated Diagnoses: Other chronic pain      clopidogrel (PLAVIX) 75 MG tablet Take 1 tablet (75 mg) by mouth daily 6/8/16 Pt is due for clinic/lab appt in Dec 2016  Qty: 90 tablet, Refills: 2    Associated Diagnoses: History of TIAs      levothyroxine (SYNTHROID, LEVOTHROID) 25 MCG tablet Take 1 tablet (25 mcg) by mouth daily  Qty: 90 tablet, Refills: 3    Associated Diagnoses: Other specified hypothyroidism      Calcium-Magnesium-Vitamin D (CITRACAL CALCIUM+D) 600- MG-MG-UNIT TB24 Take 1 tablet by mouth daily       Multiple Vitamins-Minerals (CENTRUM SILVER) per tablet Take 1 tablet by mouth daily.       !! - Potential duplicate medications found. Please discuss with provider.               Discharge Instructions and Follow-Up:     Discharge Procedure Orders  HOSPICE REFERRAL     Reason for your hospital stay   Order Comments: You were seen for worsening renal failure and NSTEMI. Your and your family's wishes were to stop all cares and prolong quality of life and focus on comfort. Therefore all medications were adjusted for your quality of life.     Activity   Order Comments: Your activity upon discharge: activity as tolerated   Order Specific Question Answer Comments   Is discharge order? Yes      When to contact your care team   Order Comments: A hospice team will contact you on 1/26/17. Home Hospice   (Maida Hayes pager [phone]) is whom to contact if there are any questions.     DNR/DNI     Oxygen  Adult   Order Comments: New Home Oxygen Order 2 liter(s) by nasal cannula as needed. Expected treatment length is indefinite (99 months).. Test on conserving device as applicable.    Patients who qualify for home O2 coverage under the CMS guidelines require ABG tests or O2 sat readings obtained closest to, but no earlier than 2 days prior to the discharge, as evidence of the need for home oxygen therapy. Testing must be performed while patient is in the chronic stable state. See notes for O2 sats.    I certify that this patient, Brent Bey has been under my care and that I, or a nurse practitioner or physician's assistant working with me, had a face-to-face encounter that meets the face-to-face encounter requirements with this patient on 1/25/2017. The patient, Brent Bey was evaluated or treated in whole, or in part, for the following medical condition, which necessitates the use of the ordered oxygen. Treatment Diagnosis: ESRD    Attending Provider: Keagan Hidalgo MD  Physician signature: See electronic signature associated with these discharge orders  Date of Order: January 25, 2017     Diet   Order Comments: Follow this diet upon discharge: Orders Placed This Encounter  Regular Diet Adult   Order Specific Question Answer Comments   Is discharge order? Yes                  Discharge Disposition:   Home Hospice   (Maida Hayes pager [phone])          Condition on Discharge:   Discharge condition: Poor   Code status on discharge: DNR / DNI        Date of service: 1/25/2017    The patient was seen and discussed with Dr. Hidalgo who agrees with assessment and plan.     Nannette Yeung PGY-3

## 2017-01-25 NOTE — PLAN OF CARE
Problem: Goal Outcome Summary  Goal: Goal Outcome Summary  4C - Per chart review and discussion with RN, pt transitioning to hospice at this time. No acute skilled inpatient PT needs at this time. Will complete consult and defer evaluation at this time. Please re-consult as appropriate if goals of care change and pt requires further skilled inpatient treatment.

## 2017-01-25 NOTE — PLAN OF CARE
Problem: Patient Care Overview (Adult)  Goal: Plan of Care Review  Outcome: No Change  D: Pt transfer from OSH; Stage 4 CKD, HTN, prior TIA. Possible NSTEMI, increased work of breathing  I/A: Arrived at 0000 1/25, Pt speaks only Latvian- appeared alert/oriented, follows commands. Daughter at bedside interpreting. VSS, afebrile. 2L nasal cannula, O2 sats >90%. Gomez- 2mg bumex given with little UOP (200cc total). No BM. Heparin gtt started (600u/hr) for possible NSTEMI. Electrolytes/labs abnormal. Pt would need dialysis for improvement- against wishes at this time.   P: Possible palliative consult and further discussion on POC/pt personal goals.

## 2017-01-25 NOTE — ED NOTES
Pt resting in cot, respirations equal and unlabored. Pt tolerating Bipap well. Updated on wait time for transfer. Denies needs at this time.

## 2017-01-25 NOTE — ED NOTES
Signout from Dr. Goodwin    Pt pending transfer to [facility] for [facility]F, Metab acidosis, hyperkalemia with ecg changes.  Renal consulted in ED and recommended standard shifting therapies.  Some improvement with these during time here (decreased K, ecg qrs narrowed).  Tolerated Bipap.  Transferred to [facility] for further eval and mgmt.  Remained oliguric throughout ED stay.  Heparin for Nstemi.        ICD-10-CM    1. Acute renal failure, unspecified acute renal failure type (H) N17.9 Basic metabolic panel (BMP)     Blood gas venous and oxyhgb     CBC with platelets     Magnesium     Magnesium     Phosphorus     Phosphorus     Glucose by meter     Glucose by meter     Glucose by meter     Glucose by meter     Glucose by meter     Glucose by meter     Creatinine POCT     ISTAT Basic Met ICa HCT POCT     ISTAT Basic Met ICa HCT POCT     CANCELED: Phosphorus     CANCELED: Magnesium     CANCELED: Creatinine POCT   2. Hyperkalemia E87.5    3. NSTEMI (non-ST elevated myocardial infarction) (H) I21.4          Chao Miranda MD  01/25/17 0116

## 2017-01-26 NOTE — TELEPHONE ENCOUNTER
Spencer, RN with  Hospice calls. Pt discharged from the hospital yesterday with orders for Hospice admission for End stage renal disease, pt has declined Hospice.     Spencer will be seeing pt today at 9am for admission and asking if Dr. Healy will follow by phone and fax and if okay for standing hospice orders. Informed Spencer that Dr. Healy is on leave and pt has not seen her for over a year. Spencer will have the Hospice Medical Director follow pt instead.

## 2017-01-26 NOTE — PLAN OF CARE
Pt discharged home with daughter via Ambulance. Discharge instructions reviewed and sent with daughter. Meds from discharge pharmacy sent with daughter. Gomez and PIV removed prior to discharge. Pt changed into personal clothing. Hand off report given to paramedics. Daughter to ride in ambulance with patient

## 2017-01-26 NOTE — PLAN OF CARE
Problem: Goal Outcome Summary  Goal: Goal Outcome Summary  Outcome: No Change  Pt and family made decision for DNR/DNI in AM, agreeable to home hospice. See Social work and Pal notes. Pt to be sent home w/ [employer] transportation at 2000, with O2. Daughter to accompany. Pt has remained off monitor since 4. Remains on 2L NC. Remove rodriguez catheter, remove final IV in L arm. Cont POC

## 2017-02-03 NOTE — TELEPHONE ENCOUNTER
Fax received from [employer] for review and signature.  Put in Dr. Stallworth's in basket on Dr. Healy's behalf.

## 2017-03-01 NOTE — TELEPHONE ENCOUNTER
[facility]  HHC and Plan of Care  Forms found to be missing during patient's audit  Gave to   Per alphabet protocol  Fax back

## 2017-05-01 ENCOUNTER — TELEPHONE (OUTPATIENT)
Dept: INTERNAL MEDICINE | Facility: CLINIC | Age: 82
End: 2017-05-01

## 2017-05-01 PROCEDURE — G0179 MD RECERTIFICATION HHA PT: HCPCS | Performed by: INTERNAL MEDICINE

## 2017-07-05 ENCOUNTER — TELEPHONE (OUTPATIENT)
Dept: INTERNAL MEDICINE | Facility: CLINIC | Age: 82
End: 2017-07-05
